# Patient Record
Sex: MALE | Race: BLACK OR AFRICAN AMERICAN | NOT HISPANIC OR LATINO | URBAN - METROPOLITAN AREA
[De-identification: names, ages, dates, MRNs, and addresses within clinical notes are randomized per-mention and may not be internally consistent; named-entity substitution may affect disease eponyms.]

---

## 2021-08-30 ENCOUNTER — OUTPATIENT (OUTPATIENT)
Dept: OUTPATIENT SERVICES | Facility: HOSPITAL | Age: 56
LOS: 1 days | End: 2021-08-30
Payer: MEDICAID

## 2021-08-30 VITALS — WEIGHT: 265 LBS | RESPIRATION RATE: 18 BRPM | TEMPERATURE: 99 F | HEIGHT: 69 IN

## 2021-08-30 DIAGNOSIS — Z29.9 ENCOUNTER FOR PROPHYLACTIC MEASURES, UNSPECIFIED: ICD-10-CM

## 2021-08-30 DIAGNOSIS — E66.01 MORBID (SEVERE) OBESITY DUE TO EXCESS CALORIES: ICD-10-CM

## 2021-08-30 DIAGNOSIS — I10 ESSENTIAL (PRIMARY) HYPERTENSION: ICD-10-CM

## 2021-08-30 DIAGNOSIS — K43.6 OTHER AND UNSPECIFIED VENTRAL HERNIA WITH OBSTRUCTION, WITHOUT GANGRENE: ICD-10-CM

## 2021-08-30 DIAGNOSIS — Z98.890 OTHER SPECIFIED POSTPROCEDURAL STATES: Chronic | ICD-10-CM

## 2021-08-30 DIAGNOSIS — Z01.818 ENCOUNTER FOR OTHER PREPROCEDURAL EXAMINATION: ICD-10-CM

## 2021-08-30 PROCEDURE — G0463: CPT

## 2021-08-30 RX ORDER — LISINOPRIL 2.5 MG/1
1 TABLET ORAL
Qty: 0 | Refills: 0 | DISCHARGE

## 2021-08-30 RX ORDER — ATORVASTATIN CALCIUM 80 MG/1
1 TABLET, FILM COATED ORAL
Qty: 0 | Refills: 0 | DISCHARGE

## 2021-08-30 NOTE — H&P PST ADULT - NSICDXFAMILYHX_GEN_ALL_CORE_FT
FAMILY HISTORY:  Father  Still living? Yes, Estimated age: 81  Family history of diabetes mellitus (DM), Age at diagnosis: Age Unknown    Mother  Still living? Yes, Estimated age: 80  Family history of diabetes mellitus (DM), Age at diagnosis: Age Unknown  FH: CAD (coronary artery disease), Age at diagnosis: Age Unknown

## 2021-08-30 NOTE — H&P PST ADULT - NSANTHOSAYNRD_GEN_A_CORE
No. SILVANO screening performed.  STOP BANG Legend: 0-2 = LOW Risk; 3-4 = INTERMEDIATE Risk; 5-8 = HIGH Risk

## 2021-08-30 NOTE — H&P PST ADULT - NSICDXPASTMEDICALHX_GEN_ALL_CORE_FT
PAST MEDICAL HISTORY:  DM (diabetes mellitus)     Morbidly obese     Morbidly obese     Other and unspecified ventral hernia with obstruction, without gangrene

## 2021-08-30 NOTE — H&P PST ADULT - HISTORY OF PRESENT ILLNESS
55 yr old male morbid obese (BMI 39) diabetes, HTN, patient had PVC's on his EKG done by PMD and was referred to cardiologist for workup. Pt stated last 6 months he experienced some abdominal discomfort especially carrying heavy groceries. Pt seen by his PMD and evaluated his ventral hernia with obstruction without gangrene. Pt is schedule for incarcerated ventral hernia repair on 9/15/2021. Pt also instructed on the use of chlorhexidine wash the day of surgery and verbalized understanding.

## 2021-08-30 NOTE — H&P PST ADULT - ASSESSMENT
55 yr old male with history of diabetes, HTN, had 6 months ago some discomfort in his abdomen and evaluated by his PMD. Pt BMI (39) and was referred to surgeon to evaluated his ventral hernia with obstruction. Pt now schedule for incarcerated ventral hernia repair on 9/15/2021.

## 2021-09-12 ENCOUNTER — APPOINTMENT (OUTPATIENT)
Dept: DISASTER EMERGENCY | Facility: CLINIC | Age: 56
End: 2021-09-12

## 2021-09-12 DIAGNOSIS — Z01.818 ENCOUNTER FOR OTHER PREPROCEDURAL EXAMINATION: ICD-10-CM

## 2021-09-12 PROBLEM — Z00.00 ENCOUNTER FOR PREVENTIVE HEALTH EXAMINATION: Status: ACTIVE | Noted: 2021-09-12

## 2021-09-13 LAB — SARS-COV-2 N GENE NPH QL NAA+PROBE: NOT DETECTED

## 2021-09-14 ENCOUNTER — TRANSCRIPTION ENCOUNTER (OUTPATIENT)
Age: 56
End: 2021-09-14

## 2021-09-15 ENCOUNTER — OUTPATIENT (OUTPATIENT)
Dept: OUTPATIENT SERVICES | Facility: HOSPITAL | Age: 56
LOS: 1 days | End: 2021-09-15
Payer: MEDICAID

## 2021-09-15 VITALS
HEART RATE: 90 BPM | DIASTOLIC BLOOD PRESSURE: 66 MMHG | OXYGEN SATURATION: 97 % | SYSTOLIC BLOOD PRESSURE: 136 MMHG | WEIGHT: 265 LBS | TEMPERATURE: 99 F | HEIGHT: 69 IN | RESPIRATION RATE: 16 BRPM

## 2021-09-15 VITALS
TEMPERATURE: 98 F | RESPIRATION RATE: 17 BRPM | OXYGEN SATURATION: 99 % | DIASTOLIC BLOOD PRESSURE: 78 MMHG | SYSTOLIC BLOOD PRESSURE: 146 MMHG | HEART RATE: 77 BPM

## 2021-09-15 DIAGNOSIS — K43.6 OTHER AND UNSPECIFIED VENTRAL HERNIA WITH OBSTRUCTION, WITHOUT GANGRENE: ICD-10-CM

## 2021-09-15 DIAGNOSIS — Z98.890 OTHER SPECIFIED POSTPROCEDURAL STATES: Chronic | ICD-10-CM

## 2021-09-15 LAB
GLUCOSE BLDC GLUCOMTR-MCNC: 206 MG/DL — HIGH (ref 70–99)
GLUCOSE BLDC GLUCOMTR-MCNC: 207 MG/DL — HIGH (ref 70–99)
GLUCOSE BLDC GLUCOMTR-MCNC: 291 MG/DL — HIGH (ref 70–99)

## 2021-09-15 PROCEDURE — 49561: CPT

## 2021-09-15 PROCEDURE — 82962 GLUCOSE BLOOD TEST: CPT

## 2021-09-15 RX ORDER — OXYCODONE AND ACETAMINOPHEN 5; 325 MG/1; MG/1
1 TABLET ORAL
Qty: 30 | Refills: 0
Start: 2021-09-15 | End: 2021-09-19

## 2021-09-15 RX ORDER — SODIUM CHLORIDE 9 MG/ML
3 INJECTION INTRAMUSCULAR; INTRAVENOUS; SUBCUTANEOUS EVERY 8 HOURS
Refills: 0 | Status: DISCONTINUED | OUTPATIENT
Start: 2021-09-15 | End: 2021-09-15

## 2021-09-15 RX ORDER — FENTANYL CITRATE 50 UG/ML
25 INJECTION INTRAVENOUS
Refills: 0 | Status: DISCONTINUED | OUTPATIENT
Start: 2021-09-15 | End: 2021-09-15

## 2021-09-15 RX ORDER — ONDANSETRON 8 MG/1
4 TABLET, FILM COATED ORAL ONCE
Refills: 0 | Status: DISCONTINUED | OUTPATIENT
Start: 2021-09-15 | End: 2021-09-15

## 2021-09-15 RX ORDER — OXYCODONE HYDROCHLORIDE 5 MG/1
5 TABLET ORAL ONCE
Refills: 0 | Status: DISCONTINUED | OUTPATIENT
Start: 2021-09-15 | End: 2021-09-15

## 2021-09-15 RX ORDER — SODIUM CHLORIDE 9 MG/ML
1000 INJECTION, SOLUTION INTRAVENOUS
Refills: 0 | Status: DISCONTINUED | OUTPATIENT
Start: 2021-09-15 | End: 2021-09-15

## 2021-09-15 NOTE — ASU DISCHARGE PLAN (ADULT/PEDIATRIC) - ASU DC SPECIAL INSTRUCTIONSFT
Pain control:  Motrin 600mg every 6 hours for mild to moderate pain.  Percocet 5/325mg 1-2 tablets every 4-6 hours for moderate to severe pain    Avoid lifting over 15 lbs.    You may remove the clear dressing and gauze on Thursday afternoon, but leave the paper strips in place as they will fall off on their own. You may begin showering at this time. Allow warm soapy water to run over wound, and pat dry. Do not rub wound, and no soaking.    Follow-up in clinic in two weeks.

## 2021-09-15 NOTE — ASU DISCHARGE PLAN (ADULT/PEDIATRIC) - CARE PROVIDER_API CALL
Shiva Bullock)  ColonRectal Surgery; Surgery  [address] [place] [address]  Phone: [phone]  Fax: [phone]  Established Patient  Follow Up Time: 2 weeks

## 2024-01-01 ENCOUNTER — INPATIENT (INPATIENT)
Facility: HOSPITAL | Age: 59
LOS: 2 days | End: 2024-03-15
Attending: EMERGENCY MEDICINE | Admitting: INTERNAL MEDICINE
Payer: MEDICAID

## 2024-01-01 ENCOUNTER — RESULT REVIEW (OUTPATIENT)
Age: 59
End: 2024-01-01

## 2024-01-01 VITALS
SYSTOLIC BLOOD PRESSURE: 153 MMHG | OXYGEN SATURATION: 98 % | HEART RATE: 120 BPM | WEIGHT: 250 LBS | TEMPERATURE: 98 F | RESPIRATION RATE: 24 BRPM | DIASTOLIC BLOOD PRESSURE: 69 MMHG | HEIGHT: 69 IN

## 2024-01-01 DIAGNOSIS — G93.41 METABOLIC ENCEPHALOPATHY: ICD-10-CM

## 2024-01-01 DIAGNOSIS — K59.00 CONSTIPATION, UNSPECIFIED: ICD-10-CM

## 2024-01-01 DIAGNOSIS — I10 ESSENTIAL (PRIMARY) HYPERTENSION: ICD-10-CM
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
## 2024-01-01 DIAGNOSIS — E11.9 TYPE 2 DIABETES MELLITUS WITHOUT COMPLICATIONS: ICD-10-CM

## 2024-01-01 DIAGNOSIS — A41.9 SEPSIS, UNSPECIFIED ORGANISM: ICD-10-CM

## 2024-01-01 DIAGNOSIS — Z98.890 OTHER SPECIFIED POSTPROCEDURAL STATES: Chronic | ICD-10-CM

## 2024-01-01 DIAGNOSIS — I48.91 UNSPECIFIED ATRIAL FIBRILLATION: ICD-10-CM

## 2024-01-01 DIAGNOSIS — M54.9 DORSALGIA, UNSPECIFIED: ICD-10-CM

## 2024-01-01 LAB
-  AMPICILLIN/SULBACTAM: SIGNIFICANT CHANGE UP
-  CEFAZOLIN: SIGNIFICANT CHANGE UP
-  CLINDAMYCIN: SIGNIFICANT CHANGE UP
-  ERYTHROMYCIN: SIGNIFICANT CHANGE UP
-  GENTAMICIN: SIGNIFICANT CHANGE UP
-  OXACILLIN: SIGNIFICANT CHANGE UP
-  PENICILLIN: SIGNIFICANT CHANGE UP
-  RIFAMPIN: SIGNIFICANT CHANGE UP
-  TETRACYCLINE: SIGNIFICANT CHANGE UP
-  TRIMETHOPRIM/SULFAMETHOXAZOLE: SIGNIFICANT CHANGE UP
-  VANCOMYCIN: SIGNIFICANT CHANGE UP
A1C WITH ESTIMATED AVERAGE GLUCOSE RESULT: 9.3 % — HIGH (ref 4–5.6)
ALBUMIN SERPL ELPH-MCNC: 1.4 G/DL — LOW (ref 3.3–5)
ALBUMIN SERPL ELPH-MCNC: 1.8 G/DL — LOW (ref 3.3–5)
ALBUMIN SERPL ELPH-MCNC: 1.9 G/DL — LOW (ref 3.3–5)
ALBUMIN SERPL ELPH-MCNC: 2.4 G/DL — LOW (ref 3.3–5)
ALBUMIN SERPL ELPH-MCNC: 3 G/DL — LOW (ref 3.3–5)
ALP SERPL-CCNC: 128 U/L — HIGH (ref 40–120)
ALP SERPL-CCNC: 136 U/L — HIGH (ref 40–120)
ALP SERPL-CCNC: 139 U/L — HIGH (ref 40–120)
ALP SERPL-CCNC: 144 U/L — HIGH (ref 40–120)
ALP SERPL-CCNC: 372 U/L — HIGH (ref 40–120)
ALT FLD-CCNC: 41 U/L — SIGNIFICANT CHANGE UP (ref 12–78)
ALT FLD-CCNC: 46 U/L — SIGNIFICANT CHANGE UP (ref 12–78)
ALT FLD-CCNC: 55 U/L — SIGNIFICANT CHANGE UP (ref 12–78)
ALT FLD-CCNC: 5506 U/L — HIGH (ref 12–78)
ALT FLD-CCNC: 60 U/L — SIGNIFICANT CHANGE UP (ref 12–78)
AMMONIA BLD-MCNC: 179 UMOL/L — HIGH (ref 11–32)
AMMONIA BLD-MCNC: 188 UMOL/L — HIGH (ref 11–32)
AMMONIA BLD-MCNC: 73 UMOL/L — HIGH (ref 11–32)
AMMONIA BLD-MCNC: 82 UMOL/L — HIGH (ref 11–32)
ANION GAP SERPL CALC-SCNC: 11 MMOL/L — SIGNIFICANT CHANGE UP (ref 5–17)
ANION GAP SERPL CALC-SCNC: 11 MMOL/L — SIGNIFICANT CHANGE UP (ref 5–17)
ANION GAP SERPL CALC-SCNC: 14 MMOL/L — SIGNIFICANT CHANGE UP (ref 5–17)
ANION GAP SERPL CALC-SCNC: 17 MMOL/L — SIGNIFICANT CHANGE UP (ref 5–17)
ANION GAP SERPL CALC-SCNC: 17 MMOL/L — SIGNIFICANT CHANGE UP (ref 5–17)
ANION GAP SERPL CALC-SCNC: 19 MMOL/L — HIGH (ref 5–17)
ANION GAP SERPL CALC-SCNC: 22 MMOL/L — HIGH (ref 5–17)
APPEARANCE UR: CLEAR — SIGNIFICANT CHANGE UP
APTT BLD: 27.3 SEC — SIGNIFICANT CHANGE UP (ref 24.5–35.6)
AST SERPL-CCNC: 21 U/L — SIGNIFICANT CHANGE UP (ref 15–37)
AST SERPL-CCNC: 23 U/L — SIGNIFICANT CHANGE UP (ref 15–37)
AST SERPL-CCNC: 35 U/L — SIGNIFICANT CHANGE UP (ref 15–37)
AST SERPL-CCNC: 46 U/L — HIGH (ref 15–37)
AST SERPL-CCNC: HIGH U/L (ref 15–37)
B-OH-BUTYR SERPL-SCNC: 3.4 MMOL/L — HIGH
BASE EXCESS BLDA CALC-SCNC: -0.9 MMOL/L — SIGNIFICANT CHANGE UP (ref -2–3)
BASE EXCESS BLDA CALC-SCNC: -11.7 MMOL/L — LOW (ref -2–3)
BASE EXCESS BLDA CALC-SCNC: -15.8 MMOL/L — LOW (ref -2–3)
BASE EXCESS BLDA CALC-SCNC: -5.6 MMOL/L — LOW (ref -2–3)
BASE EXCESS BLDV CALC-SCNC: -7.5 MMOL/L — LOW (ref -2–3)
BASOPHILS # BLD AUTO: 0 K/UL — SIGNIFICANT CHANGE UP (ref 0–0.2)
BASOPHILS # BLD AUTO: 0 K/UL — SIGNIFICANT CHANGE UP (ref 0–0.2)
BASOPHILS # BLD AUTO: 0.02 K/UL — SIGNIFICANT CHANGE UP (ref 0–0.2)
BASOPHILS # BLD AUTO: 0.13 K/UL — SIGNIFICANT CHANGE UP (ref 0–0.2)
BASOPHILS NFR BLD AUTO: 0 % — SIGNIFICANT CHANGE UP (ref 0–2)
BASOPHILS NFR BLD AUTO: 0 % — SIGNIFICANT CHANGE UP (ref 0–2)
BASOPHILS NFR BLD AUTO: 0.1 % — SIGNIFICANT CHANGE UP (ref 0–2)
BASOPHILS NFR BLD AUTO: 0.7 % — SIGNIFICANT CHANGE UP (ref 0–2)
BILIRUB SERPL-MCNC: 0.4 MG/DL — SIGNIFICANT CHANGE UP (ref 0.2–1.2)
BILIRUB SERPL-MCNC: 0.5 MG/DL — SIGNIFICANT CHANGE UP (ref 0.2–1.2)
BILIRUB SERPL-MCNC: 0.5 MG/DL — SIGNIFICANT CHANGE UP (ref 0.2–1.2)
BILIRUB SERPL-MCNC: 0.6 MG/DL — SIGNIFICANT CHANGE UP (ref 0.2–1.2)
BILIRUB SERPL-MCNC: 1 MG/DL — SIGNIFICANT CHANGE UP (ref 0.2–1.2)
BILIRUB UR-MCNC: NEGATIVE — SIGNIFICANT CHANGE UP
BLOOD GAS COMMENTS ARTERIAL: SIGNIFICANT CHANGE UP
BLOOD GAS COMMENTS, VENOUS: SIGNIFICANT CHANGE UP
BUN SERPL-MCNC: 37 MG/DL — HIGH (ref 7–23)
BUN SERPL-MCNC: 40 MG/DL — HIGH (ref 7–23)
BUN SERPL-MCNC: 45 MG/DL — HIGH (ref 7–23)
BUN SERPL-MCNC: 60 MG/DL — HIGH (ref 7–23)
BUN SERPL-MCNC: 73 MG/DL — HIGH (ref 7–23)
BUN SERPL-MCNC: 91 MG/DL — HIGH (ref 7–23)
BUN SERPL-MCNC: 92 MG/DL — HIGH (ref 7–23)
CALCIUM SERPL-MCNC: 10.3 MG/DL — HIGH (ref 8.5–10.1)
CALCIUM SERPL-MCNC: 11.7 MG/DL — HIGH (ref 8.5–10.1)
CALCIUM SERPL-MCNC: 8.5 MG/DL — SIGNIFICANT CHANGE UP (ref 8.5–10.1)
CALCIUM SERPL-MCNC: 9 MG/DL — SIGNIFICANT CHANGE UP (ref 8.5–10.1)
CALCIUM SERPL-MCNC: 9.1 MG/DL — SIGNIFICANT CHANGE UP (ref 8.5–10.1)
CALCIUM SERPL-MCNC: 9.4 MG/DL — SIGNIFICANT CHANGE UP (ref 8.5–10.1)
CALCIUM SERPL-MCNC: 9.4 MG/DL — SIGNIFICANT CHANGE UP (ref 8.5–10.1)
CHLORIDE BLDV-SCNC: 94 MMOL/L — LOW (ref 98–107)
CHLORIDE SERPL-SCNC: 101 MMOL/L — SIGNIFICANT CHANGE UP (ref 96–108)
CHLORIDE SERPL-SCNC: 103 MMOL/L — SIGNIFICANT CHANGE UP (ref 96–108)
CHLORIDE SERPL-SCNC: 107 MMOL/L — SIGNIFICANT CHANGE UP (ref 96–108)
CHLORIDE SERPL-SCNC: 96 MMOL/L — SIGNIFICANT CHANGE UP (ref 96–108)
CHLORIDE SERPL-SCNC: 99 MMOL/L — SIGNIFICANT CHANGE UP (ref 96–108)
CHOLEST SERPL-MCNC: 129 MG/DL — SIGNIFICANT CHANGE UP
CO2 BLDA-SCNC: 19 MMOL/L — SIGNIFICANT CHANGE UP (ref 19–24)
CO2 BLDA-SCNC: 22 MMOL/L — SIGNIFICANT CHANGE UP (ref 19–24)
CO2 BLDA-SCNC: 22 MMOL/L — SIGNIFICANT CHANGE UP (ref 19–24)
CO2 BLDA-SCNC: 23 MMOL/L — SIGNIFICANT CHANGE UP (ref 19–24)
CO2 BLDV-SCNC: 18 MMOL/L — LOW (ref 22–26)
CO2 SERPL-SCNC: 14 MMOL/L — LOW (ref 22–31)
CO2 SERPL-SCNC: 16 MMOL/L — LOW (ref 22–31)
CO2 SERPL-SCNC: 18 MMOL/L — LOW (ref 22–31)
CO2 SERPL-SCNC: 18 MMOL/L — LOW (ref 22–31)
CO2 SERPL-SCNC: 19 MMOL/L — LOW (ref 22–31)
CO2 SERPL-SCNC: 20 MMOL/L — LOW (ref 22–31)
CO2 SERPL-SCNC: 22 MMOL/L — SIGNIFICANT CHANGE UP (ref 22–31)
COLOR SPEC: YELLOW — SIGNIFICANT CHANGE UP
CREAT SERPL-MCNC: 0.92 MG/DL — SIGNIFICANT CHANGE UP (ref 0.5–1.3)
CREAT SERPL-MCNC: 1.11 MG/DL — SIGNIFICANT CHANGE UP (ref 0.5–1.3)
CREAT SERPL-MCNC: 1.17 MG/DL — SIGNIFICANT CHANGE UP (ref 0.5–1.3)
CREAT SERPL-MCNC: 1.46 MG/DL — HIGH (ref 0.5–1.3)
CREAT SERPL-MCNC: 1.5 MG/DL — HIGH (ref 0.5–1.3)
CREAT SERPL-MCNC: 2.12 MG/DL — HIGH (ref 0.5–1.3)
CREAT SERPL-MCNC: 2.89 MG/DL — HIGH (ref 0.5–1.3)
CULTURE RESULTS: ABNORMAL
CULTURE RESULTS: SIGNIFICANT CHANGE UP
DIFF PNL FLD: ABNORMAL
EGFR: 24 ML/MIN/1.73M2 — LOW
EGFR: 35 ML/MIN/1.73M2 — LOW
EGFR: 53 ML/MIN/1.73M2 — LOW
EGFR: 55 ML/MIN/1.73M2 — LOW
EGFR: 72 ML/MIN/1.73M2 — SIGNIFICANT CHANGE UP
EGFR: 76 ML/MIN/1.73M2 — SIGNIFICANT CHANGE UP
EGFR: 96 ML/MIN/1.73M2 — SIGNIFICANT CHANGE UP
EOSINOPHIL # BLD AUTO: 0 K/UL — SIGNIFICANT CHANGE UP (ref 0–0.5)
EOSINOPHIL NFR BLD AUTO: 0 % — SIGNIFICANT CHANGE UP (ref 0–6)
ESTIMATED AVERAGE GLUCOSE: 220 MG/DL — HIGH (ref 68–114)
GAS PNL BLDA: SIGNIFICANT CHANGE UP
GAS PNL BLDV: 128 MMOL/L — LOW (ref 136–145)
GAS PNL BLDV: SIGNIFICANT CHANGE UP
GLUCOSE BLDC GLUCOMTR-MCNC: 117 MG/DL — HIGH (ref 70–99)
GLUCOSE BLDC GLUCOMTR-MCNC: 152 MG/DL — HIGH (ref 70–99)
GLUCOSE BLDC GLUCOMTR-MCNC: 252 MG/DL — HIGH (ref 70–99)
GLUCOSE BLDC GLUCOMTR-MCNC: 252 MG/DL — HIGH (ref 70–99)
GLUCOSE BLDC GLUCOMTR-MCNC: 274 MG/DL — HIGH (ref 70–99)
GLUCOSE BLDC GLUCOMTR-MCNC: 333 MG/DL — HIGH (ref 70–99)
GLUCOSE BLDC GLUCOMTR-MCNC: 337 MG/DL — HIGH (ref 70–99)
GLUCOSE BLDC GLUCOMTR-MCNC: 345 MG/DL — HIGH (ref 70–99)
GLUCOSE BLDC GLUCOMTR-MCNC: 358 MG/DL — HIGH (ref 70–99)
GLUCOSE BLDC GLUCOMTR-MCNC: 361 MG/DL — HIGH (ref 70–99)
GLUCOSE BLDC GLUCOMTR-MCNC: 393 MG/DL — HIGH (ref 70–99)
GLUCOSE BLDC GLUCOMTR-MCNC: 394 MG/DL — HIGH (ref 70–99)
GLUCOSE BLDC GLUCOMTR-MCNC: 398 MG/DL — HIGH (ref 70–99)
GLUCOSE BLDC GLUCOMTR-MCNC: 407 MG/DL — HIGH (ref 70–99)
GLUCOSE BLDC GLUCOMTR-MCNC: 412 MG/DL — HIGH (ref 70–99)
GLUCOSE BLDC GLUCOMTR-MCNC: 512 MG/DL — CRITICAL HIGH (ref 70–99)
GLUCOSE BLDV-MCNC: 476 MG/DL — CRITICAL HIGH (ref 65–95)
GLUCOSE SERPL-MCNC: 148 MG/DL — HIGH (ref 70–99)
GLUCOSE SERPL-MCNC: 276 MG/DL — HIGH (ref 70–99)
GLUCOSE SERPL-MCNC: 381 MG/DL — HIGH (ref 70–99)
GLUCOSE SERPL-MCNC: 430 MG/DL — HIGH (ref 70–99)
GLUCOSE SERPL-MCNC: 438 MG/DL — HIGH (ref 70–99)
GLUCOSE SERPL-MCNC: 485 MG/DL — CRITICAL HIGH (ref 70–99)
GLUCOSE SERPL-MCNC: 494 MG/DL — CRITICAL HIGH (ref 70–99)
GLUCOSE UR QL: >=1000 MG/DL
GRAM STN FLD: ABNORMAL
HAV IGM SER-ACNC: SIGNIFICANT CHANGE UP
HBV CORE IGM SER-ACNC: SIGNIFICANT CHANGE UP
HBV SURFACE AG SER-ACNC: SIGNIFICANT CHANGE UP
HCO3 BLDA-SCNC: 18 MMOL/L — LOW (ref 21–28)
HCO3 BLDA-SCNC: 19 MMOL/L — LOW (ref 21–28)
HCO3 BLDA-SCNC: 20 MMOL/L — LOW (ref 21–28)
HCO3 BLDA-SCNC: 22 MMOL/L — SIGNIFICANT CHANGE UP (ref 21–28)
HCO3 BLDV-SCNC: 17 MMOL/L — LOW (ref 22–28)
HCOV PNL SPEC NAA+PROBE: DETECTED
HCT VFR BLD CALC: 41 % — SIGNIFICANT CHANGE UP (ref 39–50)
HCT VFR BLD CALC: 43.2 % — SIGNIFICANT CHANGE UP (ref 39–50)
HCT VFR BLD CALC: 43.6 % — SIGNIFICANT CHANGE UP (ref 39–50)
HCT VFR BLD CALC: 44.3 % — SIGNIFICANT CHANGE UP (ref 39–50)
HCT VFR BLD CALC: 45.4 % — SIGNIFICANT CHANGE UP (ref 39–50)
HCT VFR BLD CALC: 46.4 % — SIGNIFICANT CHANGE UP (ref 39–50)
HCT VFR BLDA CALC: 49 % — HIGH (ref 37–47)
HCV AB S/CO SERPL IA: 0.71 S/CO — SIGNIFICANT CHANGE UP (ref 0–0.99)
HCV AB S/CO SERPL IA: 0.81 S/CO — SIGNIFICANT CHANGE UP (ref 0–0.99)
HCV AB SERPL-IMP: ABNORMAL
HCV AB SERPL-IMP: SIGNIFICANT CHANGE UP
HDLC SERPL-MCNC: 14 MG/DL — LOW
HGB BLD CALC-MCNC: 16.2 G/DL — SIGNIFICANT CHANGE UP (ref 12.6–17.4)
HGB BLD-MCNC: 14.1 G/DL — SIGNIFICANT CHANGE UP (ref 13–17)
HGB BLD-MCNC: 14.4 G/DL — SIGNIFICANT CHANGE UP (ref 13–17)
HGB BLD-MCNC: 14.9 G/DL — SIGNIFICANT CHANGE UP (ref 13–17)
HGB BLD-MCNC: 15.4 G/DL — SIGNIFICANT CHANGE UP (ref 13–17)
HOROWITZ INDEX BLDA+IHG-RTO: 100 — SIGNIFICANT CHANGE UP
HOROWITZ INDEX BLDA+IHG-RTO: 100 — SIGNIFICANT CHANGE UP
HOROWITZ INDEX BLDA+IHG-RTO: 29 — SIGNIFICANT CHANGE UP
HOROWITZ INDEX BLDA+IHG-RTO: 36 — SIGNIFICANT CHANGE UP
HOROWITZ INDEX BLDV+IHG-RTO: 21 — SIGNIFICANT CHANGE UP
IMM GRANULOCYTES NFR BLD AUTO: 1.7 % — HIGH (ref 0–0.9)
IMM GRANULOCYTES NFR BLD AUTO: 2.6 % — HIGH (ref 0–0.9)
INR BLD: 1.16 RATIO — SIGNIFICANT CHANGE UP (ref 0.85–1.18)
KETONES UR-MCNC: 40 MG/DL
LACTATE BLDV-MCNC: 2.8 MMOL/L — HIGH (ref 0.56–1.39)
LACTATE SERPL-SCNC: 1.9 MMOL/L — SIGNIFICANT CHANGE UP (ref 0.7–2)
LACTATE SERPL-SCNC: 1.9 MMOL/L — SIGNIFICANT CHANGE UP (ref 0.7–2)
LACTATE SERPL-SCNC: 14.5 MMOL/L — CRITICAL HIGH (ref 0.7–2)
LACTATE SERPL-SCNC: 2.1 MMOL/L — HIGH (ref 0.7–2)
LACTATE SERPL-SCNC: 3.6 MMOL/L — HIGH (ref 0.7–2)
LEUKOCYTE ESTERASE UR-ACNC: NEGATIVE — SIGNIFICANT CHANGE UP
LG PLATELETS BLD QL AUTO: SLIGHT — SIGNIFICANT CHANGE UP
LIPID PNL WITH DIRECT LDL SERPL: 86 MG/DL — SIGNIFICANT CHANGE UP
LYMPHOCYTES # BLD AUTO: 0.64 K/UL — LOW (ref 1–3.3)
LYMPHOCYTES # BLD AUTO: 1.08 K/UL — SIGNIFICANT CHANGE UP (ref 1–3.3)
LYMPHOCYTES # BLD AUTO: 10.8 % — LOW (ref 13–44)
LYMPHOCYTES # BLD AUTO: 2.38 K/UL — SIGNIFICANT CHANGE UP (ref 1–3.3)
LYMPHOCYTES # BLD AUTO: 3 % — LOW (ref 13–44)
LYMPHOCYTES # BLD AUTO: 31 % — SIGNIFICANT CHANGE UP (ref 13–44)
LYMPHOCYTES # BLD AUTO: 5.75 K/UL — HIGH (ref 1–3.3)
LYMPHOCYTES # BLD AUTO: 6 % — LOW (ref 13–44)
MAGNESIUM SERPL-MCNC: 2.8 MG/DL — HIGH (ref 1.6–2.6)
MAGNESIUM SERPL-MCNC: 3 MG/DL — HIGH (ref 1.6–2.6)
MAGNESIUM SERPL-MCNC: 3.4 MG/DL — HIGH (ref 1.6–2.6)
MAGNESIUM SERPL-MCNC: 4.3 MG/DL — HIGH (ref 1.6–2.6)
MANUAL SMEAR VERIFICATION: SIGNIFICANT CHANGE UP
MCHC RBC-ENTMCNC: 28.6 PG — SIGNIFICANT CHANGE UP (ref 27–34)
MCHC RBC-ENTMCNC: 29 PG — SIGNIFICANT CHANGE UP (ref 27–34)
MCHC RBC-ENTMCNC: 29 PG — SIGNIFICANT CHANGE UP (ref 27–34)
MCHC RBC-ENTMCNC: 29.1 PG — SIGNIFICANT CHANGE UP (ref 27–34)
MCHC RBC-ENTMCNC: 29.6 PG — SIGNIFICANT CHANGE UP (ref 27–34)
MCHC RBC-ENTMCNC: 29.6 PG — SIGNIFICANT CHANGE UP (ref 27–34)
MCHC RBC-ENTMCNC: 31.8 G/DL — LOW (ref 32–36)
MCHC RBC-ENTMCNC: 33.2 G/DL — SIGNIFICANT CHANGE UP (ref 32–36)
MCHC RBC-ENTMCNC: 33.9 G/DL — SIGNIFICANT CHANGE UP (ref 32–36)
MCHC RBC-ENTMCNC: 34.2 G/DL — SIGNIFICANT CHANGE UP (ref 32–36)
MCHC RBC-ENTMCNC: 35.1 G/DL — SIGNIFICANT CHANGE UP (ref 32–36)
MCHC RBC-ENTMCNC: 35.6 G/DL — SIGNIFICANT CHANGE UP (ref 32–36)
MCV RBC AUTO: 82.5 FL — SIGNIFICANT CHANGE UP (ref 80–100)
MCV RBC AUTO: 82.9 FL — SIGNIFICANT CHANGE UP (ref 80–100)
MCV RBC AUTO: 85 FL — SIGNIFICANT CHANGE UP (ref 80–100)
MCV RBC AUTO: 85.7 FL — SIGNIFICANT CHANGE UP (ref 80–100)
MCV RBC AUTO: 86.2 FL — SIGNIFICANT CHANGE UP (ref 80–100)
MCV RBC AUTO: 93.1 FL — SIGNIFICANT CHANGE UP (ref 80–100)
METHOD TYPE: SIGNIFICANT CHANGE UP
METHOD TYPE: SIGNIFICANT CHANGE UP
MONOCYTES # BLD AUTO: 1.27 K/UL — HIGH (ref 0–0.9)
MONOCYTES # BLD AUTO: 1.45 K/UL — HIGH (ref 0–0.9)
MONOCYTES # BLD AUTO: 1.54 K/UL — HIGH (ref 0–0.9)
MONOCYTES # BLD AUTO: 2.41 K/UL — HIGH (ref 0–0.9)
MONOCYTES NFR BLD AUTO: 13 % — SIGNIFICANT CHANGE UP (ref 2–14)
MONOCYTES NFR BLD AUTO: 6 % — SIGNIFICANT CHANGE UP (ref 2–14)
MONOCYTES NFR BLD AUTO: 6.6 % — SIGNIFICANT CHANGE UP (ref 2–14)
MONOCYTES NFR BLD AUTO: 8.5 % — SIGNIFICANT CHANGE UP (ref 2–14)
MSSA DNA SPEC QL NAA+PROBE: SIGNIFICANT CHANGE UP
NEUTROPHILS # BLD AUTO: 14.9 K/UL — HIGH (ref 1.8–7.4)
NEUTROPHILS # BLD AUTO: 17.77 K/UL — HIGH (ref 1.8–7.4)
NEUTROPHILS # BLD AUTO: 19.29 K/UL — HIGH (ref 1.8–7.4)
NEUTROPHILS # BLD AUTO: 9.08 K/UL — HIGH (ref 1.8–7.4)
NEUTROPHILS NFR BLD AUTO: 46 % — SIGNIFICANT CHANGE UP (ref 43–77)
NEUTROPHILS NFR BLD AUTO: 80.8 % — HIGH (ref 43–77)
NEUTROPHILS NFR BLD AUTO: 82.2 % — HIGH (ref 43–77)
NEUTROPHILS NFR BLD AUTO: 91 % — HIGH (ref 43–77)
NEUTS BAND # BLD: 3 % — SIGNIFICANT CHANGE UP (ref 0–8)
NITRITE UR-MCNC: NEGATIVE — SIGNIFICANT CHANGE UP
NON HDL CHOLESTEROL: 115 MG/DL — SIGNIFICANT CHANGE UP
NRBC # BLD: 0 /100 WBCS — SIGNIFICANT CHANGE UP (ref 0–0)
NRBC # BLD: SIGNIFICANT CHANGE UP /100 WBCS (ref 0–0)
NRBC # BLD: SIGNIFICANT CHANGE UP /100 WBCS (ref 0–0)
ORGANISM # SPEC MICROSCOPIC CNT: ABNORMAL
ORGANISM # SPEC MICROSCOPIC CNT: ABNORMAL
ORGANISM # SPEC MICROSCOPIC CNT: SIGNIFICANT CHANGE UP
OSMOLALITY SERPL: 311 MOSMOL/KG — HIGH (ref 275–300)
PCO2 BLDA: 30 MMHG — LOW (ref 32–46)
PCO2 BLDA: 30 MMHG — LOW (ref 32–46)
PCO2 BLDA: 72 MMHG — CRITICAL HIGH (ref 32–46)
PCO2 BLDA: 93 MMHG — CRITICAL HIGH (ref 32–46)
PCO2 BLDV: 30 MMHG — LOW (ref 42–55)
PH BLDA: 6.9 — CRITICAL LOW (ref 7.35–7.45)
PH BLDA: 7.05 — CRITICAL LOW (ref 7.35–7.45)
PH BLDA: 7.39 — SIGNIFICANT CHANGE UP (ref 7.35–7.45)
PH BLDA: 7.47 — HIGH (ref 7.35–7.45)
PH BLDV: 7.35 — SIGNIFICANT CHANGE UP (ref 7.32–7.43)
PH UR: 5.5 — SIGNIFICANT CHANGE UP (ref 5–8)
PHOSPHATE SERPL-MCNC: 3.2 MG/DL — SIGNIFICANT CHANGE UP (ref 2.5–4.5)
PHOSPHATE SERPL-MCNC: 4.5 MG/DL — SIGNIFICANT CHANGE UP (ref 2.5–4.5)
PHOSPHATE SERPL-MCNC: 6.2 MG/DL — HIGH (ref 2.5–4.5)
PHOSPHATE SERPL-MCNC: >18 MG/DL — HIGH (ref 2.5–4.5)
PLAT MORPH BLD: NORMAL — SIGNIFICANT CHANGE UP
PLATELET # BLD AUTO: 101 K/UL — LOW (ref 150–400)
PLATELET # BLD AUTO: 204 K/UL — SIGNIFICANT CHANGE UP (ref 150–400)
PLATELET # BLD AUTO: 234 K/UL — SIGNIFICANT CHANGE UP (ref 150–400)
PLATELET # BLD AUTO: 240 K/UL — SIGNIFICANT CHANGE UP (ref 150–400)
PLATELET # BLD AUTO: 265 K/UL — SIGNIFICANT CHANGE UP (ref 150–400)
PLATELET # BLD AUTO: 308 K/UL — SIGNIFICANT CHANGE UP (ref 150–400)
PLATELET CLUMP BLD QL SMEAR: SLIGHT
PLATELET COUNT - ESTIMATE: ABNORMAL
PO2 BLDA: 60 MMHG — LOW (ref 83–108)
PO2 BLDA: 64 MMHG — LOW (ref 83–108)
PO2 BLDA: 77 MMHG — LOW (ref 83–108)
PO2 BLDA: 80 MMHG — LOW (ref 83–108)
PO2 BLDV: 47 MMHG — HIGH (ref 25–45)
POTASSIUM BLDV-SCNC: 4.1 MMOL/L — SIGNIFICANT CHANGE UP (ref 3.5–5.1)
POTASSIUM SERPL-MCNC: 3.8 MMOL/L — SIGNIFICANT CHANGE UP (ref 3.5–5.3)
POTASSIUM SERPL-MCNC: 3.9 MMOL/L — SIGNIFICANT CHANGE UP (ref 3.5–5.3)
POTASSIUM SERPL-MCNC: 4.1 MMOL/L — SIGNIFICANT CHANGE UP (ref 3.5–5.3)
POTASSIUM SERPL-MCNC: 4.2 MMOL/L — SIGNIFICANT CHANGE UP (ref 3.5–5.3)
POTASSIUM SERPL-MCNC: 4.2 MMOL/L — SIGNIFICANT CHANGE UP (ref 3.5–5.3)
POTASSIUM SERPL-MCNC: 4.8 MMOL/L — SIGNIFICANT CHANGE UP (ref 3.5–5.3)
POTASSIUM SERPL-MCNC: 7.4 MMOL/L — CRITICAL HIGH (ref 3.5–5.3)
POTASSIUM SERPL-SCNC: 3.8 MMOL/L — SIGNIFICANT CHANGE UP (ref 3.5–5.3)
POTASSIUM SERPL-SCNC: 3.9 MMOL/L — SIGNIFICANT CHANGE UP (ref 3.5–5.3)
POTASSIUM SERPL-SCNC: 4.1 MMOL/L — SIGNIFICANT CHANGE UP (ref 3.5–5.3)
POTASSIUM SERPL-SCNC: 4.2 MMOL/L — SIGNIFICANT CHANGE UP (ref 3.5–5.3)
POTASSIUM SERPL-SCNC: 4.2 MMOL/L — SIGNIFICANT CHANGE UP (ref 3.5–5.3)
POTASSIUM SERPL-SCNC: 4.8 MMOL/L — SIGNIFICANT CHANGE UP (ref 3.5–5.3)
POTASSIUM SERPL-SCNC: 7.4 MMOL/L — CRITICAL HIGH (ref 3.5–5.3)
PROCALCITONIN SERPL-MCNC: 2.74 NG/ML — HIGH (ref 0.02–0.1)
PROT SERPL-MCNC: 5.8 GM/DL — LOW (ref 6–8.3)
PROT SERPL-MCNC: 6.9 GM/DL — SIGNIFICANT CHANGE UP (ref 6–8.3)
PROT SERPL-MCNC: 7.4 GM/DL — SIGNIFICANT CHANGE UP (ref 6–8.3)
PROT SERPL-MCNC: 7.5 GM/DL — SIGNIFICANT CHANGE UP (ref 6–8.3)
PROT SERPL-MCNC: 8.7 GM/DL — HIGH (ref 6–8.3)
PROT UR-MCNC: 30 MG/DL
PROTHROM AB SERPL-ACNC: 13.7 SEC — HIGH (ref 9.5–13)
RAPID RVP RESULT: DETECTED
RBC # BLD: 4.76 M/UL — SIGNIFICANT CHANGE UP (ref 4.2–5.8)
RBC # BLD: 4.97 M/UL — SIGNIFICANT CHANGE UP (ref 4.2–5.8)
RBC # BLD: 5.13 M/UL — SIGNIFICANT CHANGE UP (ref 4.2–5.8)
RBC # BLD: 5.21 M/UL — SIGNIFICANT CHANGE UP (ref 4.2–5.8)
RBC # BLD: 5.3 M/UL — SIGNIFICANT CHANGE UP (ref 4.2–5.8)
RBC # BLD: 5.38 M/UL — SIGNIFICANT CHANGE UP (ref 4.2–5.8)
RBC # FLD: 13.1 % — SIGNIFICANT CHANGE UP (ref 10.3–14.5)
RBC # FLD: 13.2 % — SIGNIFICANT CHANGE UP (ref 10.3–14.5)
RBC # FLD: 13.4 % — SIGNIFICANT CHANGE UP (ref 10.3–14.5)
RBC # FLD: 13.7 % — SIGNIFICANT CHANGE UP (ref 10.3–14.5)
RBC # FLD: 14 % — SIGNIFICANT CHANGE UP (ref 10.3–14.5)
RBC # FLD: 14.4 % — SIGNIFICANT CHANGE UP (ref 10.3–14.5)
RBC BLD AUTO: NORMAL — SIGNIFICANT CHANGE UP
SAO2 % BLDA: 80.1 % — LOW (ref 94–98)
SAO2 % BLDA: 81 % — LOW (ref 94–98)
SAO2 % BLDA: 95.9 % — SIGNIFICANT CHANGE UP (ref 94–98)
SAO2 % BLDA: 98 % — SIGNIFICANT CHANGE UP (ref 94–98)
SAO2 % BLDV: 75.7 % — LOW (ref 94–98)
SARS-COV-2 RNA SPEC QL NAA+PROBE: SIGNIFICANT CHANGE UP
SMUDGE CELLS # BLD: PRESENT — SIGNIFICANT CHANGE UP
SODIUM SERPL-SCNC: 129 MMOL/L — LOW (ref 135–145)
SODIUM SERPL-SCNC: 132 MMOL/L — LOW (ref 135–145)
SODIUM SERPL-SCNC: 132 MMOL/L — LOW (ref 135–145)
SODIUM SERPL-SCNC: 133 MMOL/L — LOW (ref 135–145)
SODIUM SERPL-SCNC: 136 MMOL/L — SIGNIFICANT CHANGE UP (ref 135–145)
SODIUM SERPL-SCNC: 137 MMOL/L — SIGNIFICANT CHANGE UP (ref 135–145)
SODIUM SERPL-SCNC: 147 MMOL/L — HIGH (ref 135–145)
SP GR SPEC: >1.03 — HIGH (ref 1–1.03)
SPECIMEN SOURCE: SIGNIFICANT CHANGE UP
T4 FREE SERPL-MCNC: 1.3 NG/DL — SIGNIFICANT CHANGE UP (ref 0.9–1.8)
TRIGL SERPL-MCNC: 160 MG/DL — HIGH
TSH SERPL-MCNC: 0.1 UU/ML — LOW (ref 0.36–3.74)
UROBILINOGEN FLD QL: 0.2 MG/DL — SIGNIFICANT CHANGE UP (ref 0.2–1)
VANCOMYCIN TROUGH SERPL-MCNC: 5.6 UG/ML — LOW (ref 10–20)
VARIANT LYMPHS # BLD: 7 % — HIGH (ref 0–6)
WBC # BLD: 17.05 K/UL — HIGH (ref 3.8–10.5)
WBC # BLD: 17.88 K/UL — HIGH (ref 3.8–10.5)
WBC # BLD: 18.13 K/UL — HIGH (ref 3.8–10.5)
WBC # BLD: 18.54 K/UL — HIGH (ref 3.8–10.5)
WBC # BLD: 21.2 K/UL — HIGH (ref 3.8–10.5)
WBC # BLD: 21.99 K/UL — HIGH (ref 3.8–10.5)
WBC # FLD AUTO: 17.05 K/UL — HIGH (ref 3.8–10.5)
WBC # FLD AUTO: 17.88 K/UL — HIGH (ref 3.8–10.5)
WBC # FLD AUTO: 18.13 K/UL — HIGH (ref 3.8–10.5)
WBC # FLD AUTO: 18.54 K/UL — HIGH (ref 3.8–10.5)
WBC # FLD AUTO: 21.2 K/UL — HIGH (ref 3.8–10.5)
WBC # FLD AUTO: 21.99 K/UL — HIGH (ref 3.8–10.5)

## 2024-01-01 PROCEDURE — 93010 ELECTROCARDIOGRAM REPORT: CPT

## 2024-01-01 PROCEDURE — 71045 X-RAY EXAM CHEST 1 VIEW: CPT | Mod: 26,59

## 2024-01-01 PROCEDURE — 74018 RADEX ABDOMEN 1 VIEW: CPT | Mod: 26

## 2024-01-01 PROCEDURE — 99291 CRITICAL CARE FIRST HOUR: CPT

## 2024-01-01 PROCEDURE — 95816 EEG AWAKE AND DROWSY: CPT | Mod: 26

## 2024-01-01 PROCEDURE — 71045 X-RAY EXAM CHEST 1 VIEW: CPT | Mod: 26,76

## 2024-01-01 PROCEDURE — 99232 SBSQ HOSP IP/OBS MODERATE 35: CPT

## 2024-01-01 PROCEDURE — 36620 INSERTION CATHETER ARTERY: CPT

## 2024-01-01 PROCEDURE — 76937 US GUIDE VASCULAR ACCESS: CPT | Mod: 26

## 2024-01-01 PROCEDURE — 99223 1ST HOSP IP/OBS HIGH 75: CPT

## 2024-01-01 PROCEDURE — 93306 TTE W/DOPPLER COMPLETE: CPT | Mod: 26

## 2024-01-01 PROCEDURE — 99292 CRITICAL CARE ADDL 30 MIN: CPT

## 2024-01-01 PROCEDURE — 74177 CT ABD & PELVIS W/CONTRAST: CPT | Mod: 26,MC

## 2024-01-01 PROCEDURE — 70496 CT ANGIOGRAPHY HEAD: CPT | Mod: 26

## 2024-01-01 PROCEDURE — 71250 CT THORAX DX C-: CPT | Mod: 26

## 2024-01-01 PROCEDURE — 99222 1ST HOSP IP/OBS MODERATE 55: CPT

## 2024-01-01 PROCEDURE — 70450 CT HEAD/BRAIN W/O DYE: CPT | Mod: 26,MC

## 2024-01-01 PROCEDURE — 74018 RADEX ABDOMEN 1 VIEW: CPT | Mod: 26,59

## 2024-01-01 PROCEDURE — 70498 CT ANGIOGRAPHY NECK: CPT | Mod: 26

## 2024-01-01 PROCEDURE — 72131 CT LUMBAR SPINE W/O DYE: CPT | Mod: 26,MC

## 2024-01-01 RX ORDER — INSULIN HUMAN 100 [IU]/ML
5 INJECTION, SOLUTION SUBCUTANEOUS ONCE
Refills: 0 | Status: COMPLETED | OUTPATIENT
Start: 2024-01-01 | End: 2024-01-01

## 2024-01-01 RX ORDER — SODIUM CHLORIDE 9 MG/ML
1000 INJECTION, SOLUTION INTRAVENOUS
Refills: 0 | Status: COMPLETED | OUTPATIENT
Start: 2024-01-01 | End: 2024-01-01

## 2024-01-01 RX ORDER — INSULIN LISPRO 100/ML
8 VIAL (ML) SUBCUTANEOUS
Refills: 0 | Status: ACTIVE | OUTPATIENT
Start: 2024-01-01 | End: 2025-02-09

## 2024-01-01 RX ORDER — ACETAMINOPHEN 500 MG
1000 TABLET ORAL ONCE
Refills: 0 | Status: COMPLETED | OUTPATIENT
Start: 2024-01-01 | End: 2024-01-01

## 2024-01-01 RX ORDER — LANOLIN ALCOHOL/MO/W.PET/CERES
3 CREAM (GRAM) TOPICAL AT BEDTIME
Refills: 0 | Status: DISCONTINUED | OUTPATIENT
Start: 2024-01-01 | End: 2024-01-01

## 2024-01-01 RX ORDER — LACTULOSE 10 G/15ML
200 SOLUTION ORAL ONCE
Refills: 0 | Status: COMPLETED | OUTPATIENT
Start: 2024-01-01 | End: 2024-01-01

## 2024-01-01 RX ORDER — AMIODARONE HYDROCHLORIDE 400 MG/1
0.5 TABLET ORAL
Qty: 450 | Refills: 0 | Status: DISCONTINUED | OUTPATIENT
Start: 2024-01-01 | End: 2024-01-01

## 2024-01-01 RX ORDER — LACTULOSE 10 G/15ML
30 SOLUTION ORAL EVERY 6 HOURS
Refills: 0 | Status: DISCONTINUED | OUTPATIENT
Start: 2024-01-01 | End: 2024-01-01

## 2024-01-01 RX ORDER — METOPROLOL TARTRATE 50 MG
5 TABLET ORAL ONCE
Refills: 0 | Status: COMPLETED | OUTPATIENT
Start: 2024-01-01 | End: 2024-01-01

## 2024-01-01 RX ORDER — FUROSEMIDE 40 MG
40 TABLET ORAL ONCE
Refills: 0 | Status: COMPLETED | OUTPATIENT
Start: 2024-01-01 | End: 2024-01-01

## 2024-01-01 RX ORDER — OXYCODONE HYDROCHLORIDE 5 MG/1
2.5 TABLET ORAL EVERY 4 HOURS
Refills: 0 | Status: DISCONTINUED | OUTPATIENT
Start: 2024-01-01 | End: 2024-01-01

## 2024-01-01 RX ORDER — SODIUM CHLORIDE 9 MG/ML
1000 INJECTION INTRAMUSCULAR; INTRAVENOUS; SUBCUTANEOUS ONCE
Refills: 0 | Status: COMPLETED | OUTPATIENT
Start: 2024-01-01 | End: 2024-01-01

## 2024-01-01 RX ORDER — CALCIUM GLUCONATE 100 MG/ML
2 VIAL (ML) INTRAVENOUS ONCE
Refills: 0 | Status: COMPLETED | OUTPATIENT
Start: 2024-01-01 | End: 2024-01-01

## 2024-01-01 RX ORDER — INSULIN LISPRO 100/ML
VIAL (ML) SUBCUTANEOUS EVERY 6 HOURS
Refills: 0 | Status: DISCONTINUED | OUTPATIENT
Start: 2024-01-01 | End: 2024-01-01

## 2024-01-01 RX ORDER — LISINOPRIL 2.5 MG/1
1 TABLET ORAL
Qty: 0 | Refills: 0 | DISCHARGE

## 2024-01-01 RX ORDER — SODIUM CHLORIDE 9 MG/ML
10 INJECTION INTRAMUSCULAR; INTRAVENOUS; SUBCUTANEOUS
Refills: 0 | Status: DISCONTINUED | OUTPATIENT
Start: 2024-01-01 | End: 2024-01-01

## 2024-01-01 RX ORDER — GABAPENTIN 400 MG/1
100 CAPSULE ORAL EVERY 8 HOURS
Refills: 0 | Status: ACTIVE | OUTPATIENT
Start: 2024-01-01 | End: 2025-02-08

## 2024-01-01 RX ORDER — CHLORHEXIDINE GLUCONATE 213 G/1000ML
1 SOLUTION TOPICAL
Refills: 0 | Status: DISCONTINUED | OUTPATIENT
Start: 2024-01-01 | End: 2024-01-01

## 2024-01-01 RX ORDER — GLUCAGON INJECTION, SOLUTION 0.5 MG/.1ML
1 INJECTION, SOLUTION SUBCUTANEOUS ONCE
Refills: 0 | Status: DISCONTINUED | OUTPATIENT
Start: 2024-01-01 | End: 2024-01-01

## 2024-01-01 RX ORDER — MEROPENEM 1 G/30ML
1000 INJECTION INTRAVENOUS EVERY 12 HOURS
Refills: 0 | Status: DISCONTINUED | OUTPATIENT
Start: 2024-01-01 | End: 2024-01-01

## 2024-01-01 RX ORDER — NOREPINEPHRINE BITARTRATE/D5W 8 MG/250ML
0.05 PLASTIC BAG, INJECTION (ML) INTRAVENOUS
Qty: 32 | Refills: 0 | Status: DISCONTINUED | OUTPATIENT
Start: 2024-01-01 | End: 2024-01-01

## 2024-01-01 RX ORDER — CEFAZOLIN SODIUM 1 G
VIAL (EA) INJECTION
Refills: 0 | Status: DISCONTINUED | OUTPATIENT
Start: 2024-01-01 | End: 2024-01-01

## 2024-01-01 RX ORDER — AMIODARONE HYDROCHLORIDE 400 MG/1
150 TABLET ORAL ONCE
Refills: 0 | Status: COMPLETED | OUTPATIENT
Start: 2024-01-01 | End: 2024-01-01

## 2024-01-01 RX ORDER — KETOROLAC TROMETHAMINE 30 MG/ML
15 SYRINGE (ML) INJECTION ONCE
Refills: 0 | Status: DISCONTINUED | OUTPATIENT
Start: 2024-01-01 | End: 2024-01-01

## 2024-01-01 RX ORDER — DEXTROSE 50 % IN WATER 50 %
12.5 SYRINGE (ML) INTRAVENOUS ONCE
Refills: 0 | Status: DISCONTINUED | OUTPATIENT
Start: 2024-01-01 | End: 2024-01-01

## 2024-01-01 RX ORDER — INSULIN LISPRO 100/ML
5 VIAL (ML) SUBCUTANEOUS
Refills: 0 | Status: DISCONTINUED | OUTPATIENT
Start: 2024-01-01 | End: 2024-01-01

## 2024-01-01 RX ORDER — NALOXONE HYDROCHLORIDE 4 MG/.1ML
0.4 SPRAY NASAL ONCE
Refills: 0 | Status: DISCONTINUED | OUTPATIENT
Start: 2024-01-01 | End: 2024-01-01

## 2024-01-01 RX ORDER — FENTANYL CITRATE 50 UG/ML
0.5 INJECTION INTRAVENOUS
Qty: 2500 | Refills: 0 | Status: DISCONTINUED | OUTPATIENT
Start: 2024-01-01 | End: 2024-01-01

## 2024-01-01 RX ORDER — SODIUM CHLORIDE 9 MG/ML
500 INJECTION INTRAMUSCULAR; INTRAVENOUS; SUBCUTANEOUS ONCE
Refills: 0 | Status: COMPLETED | OUTPATIENT
Start: 2024-01-01 | End: 2024-01-01

## 2024-01-01 RX ORDER — ENOXAPARIN SODIUM 100 MG/ML
110 INJECTION SUBCUTANEOUS EVERY 12 HOURS
Refills: 0 | Status: DISCONTINUED | OUTPATIENT
Start: 2024-01-01 | End: 2024-01-01

## 2024-01-01 RX ORDER — MORPHINE SULFATE 50 MG/1
2 CAPSULE, EXTENDED RELEASE ORAL ONCE
Refills: 0 | Status: DISCONTINUED | OUTPATIENT
Start: 2024-01-01 | End: 2024-01-01

## 2024-01-01 RX ORDER — LISINOPRIL 2.5 MG/1
2.5 TABLET ORAL DAILY
Refills: 0 | Status: DISCONTINUED | OUTPATIENT
Start: 2024-01-01 | End: 2024-01-01

## 2024-01-01 RX ORDER — CEFAZOLIN SODIUM 1 G
2000 VIAL (EA) INJECTION ONCE
Refills: 0 | Status: COMPLETED | OUTPATIENT
Start: 2024-01-01 | End: 2024-01-01

## 2024-01-01 RX ORDER — DEXTROSE 50 % IN WATER 50 %
15 SYRINGE (ML) INTRAVENOUS ONCE
Refills: 0 | Status: DISCONTINUED | OUTPATIENT
Start: 2024-01-01 | End: 2024-01-01

## 2024-01-01 RX ORDER — DILTIAZEM HCL 120 MG
60 CAPSULE, EXT RELEASE 24 HR ORAL EVERY 8 HOURS
Refills: 0 | Status: DISCONTINUED | OUTPATIENT
Start: 2024-01-01 | End: 2024-01-01

## 2024-01-01 RX ORDER — DILTIAZEM HCL 120 MG
20 CAPSULE, EXT RELEASE 24 HR ORAL ONCE
Refills: 0 | Status: COMPLETED | OUTPATIENT
Start: 2024-01-01 | End: 2024-01-01

## 2024-01-01 RX ORDER — LACTULOSE 10 G/15ML
200 SOLUTION ORAL DAILY
Refills: 0 | Status: DISCONTINUED | OUTPATIENT
Start: 2024-01-01 | End: 2024-01-01

## 2024-01-01 RX ORDER — SODIUM BICARBONATE 1 MEQ/ML
50 SYRINGE (ML) INTRAVENOUS ONCE
Refills: 0 | Status: COMPLETED | OUTPATIENT
Start: 2024-01-01 | End: 2024-01-01

## 2024-01-01 RX ORDER — CYCLOBENZAPRINE HYDROCHLORIDE 10 MG/1
5 TABLET, FILM COATED ORAL EVERY 8 HOURS
Refills: 0 | Status: ACTIVE | OUTPATIENT
Start: 2024-01-01 | End: 2025-02-08

## 2024-01-01 RX ORDER — LACTULOSE 10 G/15ML
20 SOLUTION ORAL
Refills: 0 | Status: DISCONTINUED | OUTPATIENT
Start: 2024-01-01 | End: 2024-01-01

## 2024-01-01 RX ORDER — MULTIVIT WITH MIN/MFOLATE/K2 340-15/3 G
296 POWDER (GRAM) ORAL ONCE
Refills: 0 | Status: DISCONTINUED | OUTPATIENT
Start: 2024-01-01 | End: 2024-01-01

## 2024-01-01 RX ORDER — CISATRACURIUM BESYLATE 2 MG/ML
20 INJECTION INTRAVENOUS ONCE
Refills: 0 | Status: DISCONTINUED | OUTPATIENT
Start: 2024-01-01 | End: 2024-01-01

## 2024-01-01 RX ORDER — INSULIN LISPRO 100/ML
VIAL (ML) SUBCUTANEOUS
Refills: 0 | Status: ACTIVE | OUTPATIENT
Start: 2024-01-01 | End: 2025-02-08

## 2024-01-01 RX ORDER — INSULIN GLARGINE 100 [IU]/ML
15 INJECTION, SOLUTION SUBCUTANEOUS AT BEDTIME
Refills: 0 | Status: DISCONTINUED | OUTPATIENT
Start: 2024-01-01 | End: 2024-01-01

## 2024-01-01 RX ORDER — DILTIAZEM HCL 120 MG
10 CAPSULE, EXT RELEASE 24 HR ORAL ONCE
Refills: 0 | Status: COMPLETED | OUTPATIENT
Start: 2024-01-01 | End: 2024-01-01

## 2024-01-01 RX ORDER — DEXTROSE 50 % IN WATER 50 %
25 SYRINGE (ML) INTRAVENOUS ONCE
Refills: 0 | Status: DISCONTINUED | OUTPATIENT
Start: 2024-01-01 | End: 2024-01-01

## 2024-01-01 RX ORDER — SODIUM CHLORIDE 9 MG/ML
1000 INJECTION, SOLUTION INTRAVENOUS ONCE
Refills: 0 | Status: COMPLETED | OUTPATIENT
Start: 2024-01-01 | End: 2024-01-01

## 2024-01-01 RX ORDER — GLIMEPIRIDE 1 MG
1 TABLET ORAL
Qty: 0 | Refills: 0 | DISCHARGE

## 2024-01-01 RX ORDER — INSULIN LISPRO 100/ML
VIAL (ML) SUBCUTANEOUS AT BEDTIME
Refills: 0 | Status: ACTIVE | OUTPATIENT
Start: 2024-01-01 | End: 2025-02-08

## 2024-01-01 RX ORDER — ACETAMINOPHEN 500 MG
650 TABLET ORAL EVERY 6 HOURS
Refills: 0 | Status: DISCONTINUED | OUTPATIENT
Start: 2024-01-01 | End: 2024-01-01

## 2024-01-01 RX ORDER — PIPERACILLIN AND TAZOBACTAM 4; .5 G/20ML; G/20ML
3.38 INJECTION, POWDER, LYOPHILIZED, FOR SOLUTION INTRAVENOUS EVERY 8 HOURS
Refills: 0 | Status: DISCONTINUED | OUTPATIENT
Start: 2024-01-01 | End: 2024-01-01

## 2024-01-01 RX ORDER — SODIUM CHLORIDE 9 MG/ML
1000 INJECTION, SOLUTION INTRAVENOUS
Refills: 0 | Status: DISCONTINUED | OUTPATIENT
Start: 2024-01-01 | End: 2024-01-01

## 2024-01-01 RX ORDER — ONDANSETRON 8 MG/1
4 TABLET, FILM COATED ORAL EVERY 8 HOURS
Refills: 0 | Status: DISCONTINUED | OUTPATIENT
Start: 2024-01-01 | End: 2024-01-01

## 2024-01-01 RX ORDER — LACTULOSE 10 G/15ML
200 SOLUTION ORAL ONCE
Refills: 0 | Status: DISCONTINUED | OUTPATIENT
Start: 2024-01-01 | End: 2024-01-01

## 2024-01-01 RX ORDER — VASOPRESSIN 20 [USP'U]/ML
0.04 INJECTION INTRAVENOUS
Qty: 40 | Refills: 0 | Status: DISCONTINUED | OUTPATIENT
Start: 2024-01-01 | End: 2024-01-01

## 2024-01-01 RX ORDER — LACTULOSE 10 G/15ML
20 SOLUTION ORAL DAILY
Refills: 0 | Status: DISCONTINUED | OUTPATIENT
Start: 2024-01-01 | End: 2024-01-01

## 2024-01-01 RX ORDER — METOPROLOL TARTRATE 50 MG
25 TABLET ORAL ONCE
Refills: 0 | Status: COMPLETED | OUTPATIENT
Start: 2024-01-01 | End: 2024-01-01

## 2024-01-01 RX ORDER — FENTANYL CITRATE 50 UG/ML
50 INJECTION INTRAVENOUS ONCE
Refills: 0 | Status: DISCONTINUED | OUTPATIENT
Start: 2024-01-01 | End: 2024-01-01

## 2024-01-01 RX ORDER — DILTIAZEM HCL 120 MG
15 CAPSULE, EXT RELEASE 24 HR ORAL
Qty: 125 | Refills: 0 | Status: DISCONTINUED | OUTPATIENT
Start: 2024-01-01 | End: 2024-01-01

## 2024-01-01 RX ORDER — INSULIN HUMAN 100 [IU]/ML
8 INJECTION, SOLUTION SUBCUTANEOUS ONCE
Refills: 0 | Status: COMPLETED | OUTPATIENT
Start: 2024-01-01 | End: 2024-01-01

## 2024-01-01 RX ORDER — AMIODARONE HYDROCHLORIDE 400 MG/1
1 TABLET ORAL
Qty: 450 | Refills: 0 | Status: DISCONTINUED | OUTPATIENT
Start: 2024-01-01 | End: 2024-01-01

## 2024-01-01 RX ORDER — ATORVASTATIN CALCIUM 80 MG/1
40 TABLET, FILM COATED ORAL AT BEDTIME
Refills: 0 | Status: ACTIVE | OUTPATIENT
Start: 2024-01-01 | End: 2025-02-08

## 2024-01-01 RX ORDER — CEFAZOLIN SODIUM 1 G
2000 VIAL (EA) INJECTION EVERY 8 HOURS
Refills: 0 | Status: DISCONTINUED | OUTPATIENT
Start: 2024-01-01 | End: 2024-01-01

## 2024-01-01 RX ORDER — INSULIN GLARGINE 100 [IU]/ML
26 INJECTION, SOLUTION SUBCUTANEOUS AT BEDTIME
Refills: 0 | Status: DISCONTINUED | OUTPATIENT
Start: 2024-01-01 | End: 2024-01-01

## 2024-01-01 RX ORDER — SENNA PLUS 8.6 MG/1
2 TABLET ORAL AT BEDTIME
Refills: 0 | Status: DISCONTINUED | OUTPATIENT
Start: 2024-01-01 | End: 2024-01-01

## 2024-01-01 RX ORDER — IPRATROPIUM/ALBUTEROL SULFATE 18-103MCG
3 AEROSOL WITH ADAPTER (GRAM) INHALATION ONCE
Refills: 0 | Status: DISCONTINUED | OUTPATIENT
Start: 2024-01-01 | End: 2024-01-01

## 2024-01-01 RX ORDER — LIDOCAINE 4 G/100G
1 CREAM TOPICAL DAILY
Refills: 0 | Status: ACTIVE | OUTPATIENT
Start: 2024-01-01 | End: 2025-02-08

## 2024-01-01 RX ORDER — LACTULOSE 10 G/15ML
20 SOLUTION ORAL EVERY 6 HOURS
Refills: 0 | Status: DISCONTINUED | OUTPATIENT
Start: 2024-01-01 | End: 2024-01-01

## 2024-01-01 RX ORDER — HYDROCORTISONE 20 MG
100 TABLET ORAL ONCE
Refills: 0 | Status: DISCONTINUED | OUTPATIENT
Start: 2024-01-01 | End: 2024-01-01

## 2024-01-01 RX ORDER — DILTIAZEM HCL 120 MG
28 CAPSULE, EXT RELEASE 24 HR ORAL ONCE
Refills: 0 | Status: COMPLETED | OUTPATIENT
Start: 2024-01-01 | End: 2024-01-01

## 2024-01-01 RX ORDER — DILTIAZEM HCL 120 MG
28 CAPSULE, EXT RELEASE 24 HR ORAL ONCE
Refills: 0 | Status: DISCONTINUED | OUTPATIENT
Start: 2024-01-01 | End: 2024-01-01

## 2024-01-01 RX ORDER — HYDROCORTISONE 20 MG
100 TABLET ORAL EVERY 8 HOURS
Refills: 0 | Status: DISCONTINUED | OUTPATIENT
Start: 2024-01-01 | End: 2024-01-01

## 2024-01-01 RX ORDER — CYCLOBENZAPRINE HYDROCHLORIDE 10 MG/1
0 TABLET, FILM COATED ORAL
Qty: 0 | Refills: 0 | DISCHARGE

## 2024-01-01 RX ORDER — ACETAMINOPHEN 500 MG
975 TABLET ORAL EVERY 6 HOURS
Refills: 0 | Status: ACTIVE | OUTPATIENT
Start: 2024-01-01 | End: 2025-02-09

## 2024-01-01 RX ORDER — SODIUM BICARBONATE 1 MEQ/ML
0.14 SYRINGE (ML) INTRAVENOUS
Qty: 150 | Refills: 0 | Status: DISCONTINUED | OUTPATIENT
Start: 2024-01-01 | End: 2024-01-01

## 2024-01-01 RX ORDER — METOPROLOL TARTRATE 50 MG
50 TABLET ORAL
Refills: 0 | Status: DISCONTINUED | OUTPATIENT
Start: 2024-01-01 | End: 2024-01-01

## 2024-01-01 RX ORDER — IPRATROPIUM/ALBUTEROL SULFATE 18-103MCG
3 AEROSOL WITH ADAPTER (GRAM) INHALATION EVERY 6 HOURS
Refills: 0 | Status: DISCONTINUED | OUTPATIENT
Start: 2024-01-01 | End: 2024-01-01

## 2024-01-01 RX ORDER — DEXTROSE 50 % IN WATER 50 %
50 SYRINGE (ML) INTRAVENOUS ONCE
Refills: 0 | Status: COMPLETED | OUTPATIENT
Start: 2024-01-01 | End: 2024-01-01

## 2024-01-01 RX ORDER — INSULIN LISPRO 100/ML
VIAL (ML) SUBCUTANEOUS EVERY 4 HOURS
Refills: 0 | Status: DISCONTINUED | OUTPATIENT
Start: 2024-01-01 | End: 2024-01-01

## 2024-01-01 RX ORDER — CHLORHEXIDINE GLUCONATE 213 G/1000ML
15 SOLUTION TOPICAL EVERY 12 HOURS
Refills: 0 | Status: DISCONTINUED | OUTPATIENT
Start: 2024-01-01 | End: 2024-01-01

## 2024-01-01 RX ORDER — CALCIUM GLUCONATE 100 MG/ML
2 VIAL (ML) INTRAVENOUS ONCE
Refills: 0 | Status: DISCONTINUED | OUTPATIENT
Start: 2024-01-01 | End: 2024-01-01

## 2024-01-01 RX ORDER — VANCOMYCIN HCL 1 G
1250 VIAL (EA) INTRAVENOUS EVERY 12 HOURS
Refills: 0 | Status: DISCONTINUED | OUTPATIENT
Start: 2024-01-01 | End: 2024-01-01

## 2024-01-01 RX ORDER — INSULIN GLARGINE 100 [IU]/ML
10 INJECTION, SOLUTION SUBCUTANEOUS ONCE
Refills: 0 | Status: COMPLETED | OUTPATIENT
Start: 2024-01-01 | End: 2024-01-01

## 2024-01-01 RX ORDER — CISATRACURIUM BESYLATE 2 MG/ML
3 INJECTION INTRAVENOUS
Qty: 200 | Refills: 0 | Status: DISCONTINUED | OUTPATIENT
Start: 2024-01-01 | End: 2024-01-01

## 2024-01-01 RX ORDER — VANCOMYCIN HCL 1 G
1000 VIAL (EA) INTRAVENOUS ONCE
Refills: 0 | Status: COMPLETED | OUTPATIENT
Start: 2024-01-01 | End: 2024-01-01

## 2024-01-01 RX ORDER — PIPERACILLIN AND TAZOBACTAM 4; .5 G/20ML; G/20ML
3.38 INJECTION, POWDER, LYOPHILIZED, FOR SOLUTION INTRAVENOUS ONCE
Refills: 0 | Status: COMPLETED | OUTPATIENT
Start: 2024-01-01 | End: 2024-01-01

## 2024-01-01 RX ORDER — KETOROLAC TROMETHAMINE 30 MG/ML
30 SYRINGE (ML) INJECTION ONCE
Refills: 0 | Status: DISCONTINUED | OUTPATIENT
Start: 2024-01-01 | End: 2024-01-01

## 2024-01-01 RX ORDER — DILTIAZEM HCL 120 MG
90 CAPSULE, EXT RELEASE 24 HR ORAL ONCE
Refills: 0 | Status: COMPLETED | OUTPATIENT
Start: 2024-01-01 | End: 2024-01-01

## 2024-01-01 RX ORDER — POLYETHYLENE GLYCOL 3350 17 G/17G
17 POWDER, FOR SOLUTION ORAL DAILY
Refills: 0 | Status: DISCONTINUED | OUTPATIENT
Start: 2024-01-01 | End: 2024-01-01

## 2024-01-01 RX ORDER — ATORVASTATIN CALCIUM 80 MG/1
1 TABLET, FILM COATED ORAL
Qty: 0 | Refills: 0 | DISCHARGE

## 2024-01-01 RX ORDER — DILTIAZEM HCL 120 MG
40 CAPSULE, EXT RELEASE 24 HR ORAL ONCE
Refills: 0 | Status: DISCONTINUED | OUTPATIENT
Start: 2024-01-01 | End: 2024-01-01

## 2024-01-01 RX ORDER — OXYCODONE HYDROCHLORIDE 5 MG/1
5 TABLET ORAL EVERY 4 HOURS
Refills: 0 | Status: DISCONTINUED | OUTPATIENT
Start: 2024-01-01 | End: 2024-01-01

## 2024-01-01 RX ORDER — METOPROLOL TARTRATE 50 MG
25 TABLET ORAL
Refills: 0 | Status: DISCONTINUED | OUTPATIENT
Start: 2024-01-01 | End: 2024-01-01

## 2024-01-01 RX ADMIN — Medication 60 MILLIGRAM(S): at 05:57

## 2024-01-01 RX ADMIN — Medication 10: at 09:00

## 2024-01-01 RX ADMIN — MORPHINE SULFATE 2 MILLIGRAM(S): 50 CAPSULE, EXTENDED RELEASE ORAL at 23:53

## 2024-01-01 RX ADMIN — Medication 1000 MILLIGRAM(S): at 14:05

## 2024-01-01 RX ADMIN — Medication 125 MILLIGRAM(S): at 01:24

## 2024-01-01 RX ADMIN — LIDOCAINE 1 PATCH: 4 CREAM TOPICAL at 19:57

## 2024-01-01 RX ADMIN — LACTULOSE 200 GRAM(S): 10 SOLUTION ORAL at 18:50

## 2024-01-01 RX ADMIN — SODIUM CHLORIDE 1000 MILLILITER(S): 9 INJECTION INTRAMUSCULAR; INTRAVENOUS; SUBCUTANEOUS at 14:34

## 2024-01-01 RX ADMIN — AMIODARONE HYDROCHLORIDE 618 MILLIGRAM(S): 400 TABLET ORAL at 08:49

## 2024-01-01 RX ADMIN — AMIODARONE HYDROCHLORIDE 33.3 MG/MIN: 400 TABLET ORAL at 11:33

## 2024-01-01 RX ADMIN — OXYCODONE HYDROCHLORIDE 5 MILLIGRAM(S): 5 TABLET ORAL at 05:42

## 2024-01-01 RX ADMIN — ENOXAPARIN SODIUM 110 MILLIGRAM(S): 100 INJECTION SUBCUTANEOUS at 18:05

## 2024-01-01 RX ADMIN — SODIUM CHLORIDE 1000 MILLILITER(S): 9 INJECTION INTRAMUSCULAR; INTRAVENOUS; SUBCUTANEOUS at 13:34

## 2024-01-01 RX ADMIN — ATORVASTATIN CALCIUM 40 MILLIGRAM(S): 80 TABLET, FILM COATED ORAL at 22:34

## 2024-01-01 RX ADMIN — Medication 8 UNIT(S): at 08:04

## 2024-01-01 RX ADMIN — Medication 1000 MILLIGRAM(S): at 08:30

## 2024-01-01 RX ADMIN — OXYCODONE HYDROCHLORIDE 5 MILLIGRAM(S): 5 TABLET ORAL at 06:42

## 2024-01-01 RX ADMIN — LACTULOSE 30 GRAM(S): 10 SOLUTION ORAL at 05:17

## 2024-01-01 RX ADMIN — LACTULOSE 30 GRAM(S): 10 SOLUTION ORAL at 00:10

## 2024-01-01 RX ADMIN — GABAPENTIN 100 MILLIGRAM(S): 400 CAPSULE ORAL at 05:43

## 2024-01-01 RX ADMIN — Medication 1000 MILLIGRAM(S): at 04:11

## 2024-01-01 RX ADMIN — PIPERACILLIN AND TAZOBACTAM 25 GRAM(S): 4; .5 INJECTION, POWDER, LYOPHILIZED, FOR SOLUTION INTRAVENOUS at 05:42

## 2024-01-01 RX ADMIN — Medication 50 MILLIEQUIVALENT(S): at 08:06

## 2024-01-01 RX ADMIN — Medication 12: at 21:46

## 2024-01-01 RX ADMIN — GABAPENTIN 100 MILLIGRAM(S): 400 CAPSULE ORAL at 05:18

## 2024-01-01 RX ADMIN — Medication 30 MILLIGRAM(S): at 11:33

## 2024-01-01 RX ADMIN — MORPHINE SULFATE 2 MILLIGRAM(S): 50 CAPSULE, EXTENDED RELEASE ORAL at 00:00

## 2024-01-01 RX ADMIN — INSULIN HUMAN 8 UNIT(S): 100 INJECTION, SOLUTION SUBCUTANEOUS at 17:53

## 2024-01-01 RX ADMIN — Medication 15 MILLIGRAM(S): at 16:53

## 2024-01-01 RX ADMIN — LACTULOSE 30 GRAM(S): 10 SOLUTION ORAL at 13:50

## 2024-01-01 RX ADMIN — MORPHINE SULFATE 2 MILLIGRAM(S): 50 CAPSULE, EXTENDED RELEASE ORAL at 22:00

## 2024-01-01 RX ADMIN — Medication 8: at 08:03

## 2024-01-01 RX ADMIN — LISINOPRIL 2.5 MILLIGRAM(S): 2.5 TABLET ORAL at 18:06

## 2024-01-01 RX ADMIN — Medication 10: at 01:45

## 2024-01-01 RX ADMIN — Medication 5 MILLIGRAM(S): at 18:04

## 2024-01-01 RX ADMIN — LACTULOSE 200 GRAM(S): 10 SOLUTION ORAL at 18:41

## 2024-01-01 RX ADMIN — Medication 4.96 MICROGRAM(S)/KG/MIN: at 11:45

## 2024-01-01 RX ADMIN — INSULIN GLARGINE 26 UNIT(S): 100 INJECTION, SOLUTION SUBCUTANEOUS at 21:45

## 2024-01-01 RX ADMIN — Medication 15 MG/HR: at 08:29

## 2024-01-01 RX ADMIN — LACTULOSE 200 GRAM(S): 10 SOLUTION ORAL at 05:19

## 2024-01-01 RX ADMIN — Medication 60 MILLIGRAM(S): at 18:40

## 2024-01-01 RX ADMIN — Medication 200 GRAM(S): at 10:19

## 2024-01-01 RX ADMIN — Medication 400 MILLIGRAM(S): at 13:33

## 2024-01-01 RX ADMIN — Medication 15 MILLIGRAM(S): at 15:53

## 2024-01-01 RX ADMIN — CISATRACURIUM BESYLATE 19 MICROGRAM(S)/KG/MIN: 2 INJECTION INTRAVENOUS at 08:50

## 2024-01-01 RX ADMIN — Medication 100 MILLIGRAM(S): at 21:44

## 2024-01-01 RX ADMIN — Medication 10 MG/HR: at 04:47

## 2024-01-01 RX ADMIN — Medication 1000 MILLIGRAM(S): at 16:35

## 2024-01-01 RX ADMIN — Medication 400 MILLIGRAM(S): at 08:29

## 2024-01-01 RX ADMIN — MORPHINE SULFATE 2 MILLIGRAM(S): 50 CAPSULE, EXTENDED RELEASE ORAL at 21:44

## 2024-01-01 RX ADMIN — SENNA PLUS 2 TABLET(S): 8.6 TABLET ORAL at 22:34

## 2024-01-01 RX ADMIN — Medication 50 MILLILITER(S): at 09:58

## 2024-01-01 RX ADMIN — Medication 400 MILLIGRAM(S): at 18:03

## 2024-01-01 RX ADMIN — PIPERACILLIN AND TAZOBACTAM 200 GRAM(S): 4; .5 INJECTION, POWDER, LYOPHILIZED, FOR SOLUTION INTRAVENOUS at 13:58

## 2024-01-01 RX ADMIN — Medication 40 MILLIGRAM(S): at 02:18

## 2024-01-01 RX ADMIN — AMIODARONE HYDROCHLORIDE 16.7 MG/MIN: 400 TABLET ORAL at 19:20

## 2024-01-01 RX ADMIN — Medication 8 UNIT(S): at 17:09

## 2024-01-01 RX ADMIN — VASOPRESSIN 6 UNIT(S)/MIN: 20 INJECTION INTRAVENOUS at 06:41

## 2024-01-01 RX ADMIN — SODIUM CHLORIDE 500 MILLILITER(S): 9 INJECTION INTRAMUSCULAR; INTRAVENOUS; SUBCUTANEOUS at 16:53

## 2024-01-01 RX ADMIN — PIPERACILLIN AND TAZOBACTAM 3.38 GRAM(S): 4; .5 INJECTION, POWDER, LYOPHILIZED, FOR SOLUTION INTRAVENOUS at 14:28

## 2024-01-01 RX ADMIN — Medication 100 MEQ/KG/HR: at 08:45

## 2024-01-01 RX ADMIN — GABAPENTIN 100 MILLIGRAM(S): 400 CAPSULE ORAL at 22:34

## 2024-01-01 RX ADMIN — GABAPENTIN 100 MILLIGRAM(S): 400 CAPSULE ORAL at 14:57

## 2024-01-01 RX ADMIN — GABAPENTIN 100 MILLIGRAM(S): 400 CAPSULE ORAL at 22:41

## 2024-01-01 RX ADMIN — Medication 60 MILLIGRAM(S): at 22:40

## 2024-01-01 RX ADMIN — CHLORHEXIDINE GLUCONATE 15 MILLILITER(S): 213 SOLUTION TOPICAL at 06:40

## 2024-01-01 RX ADMIN — FENTANYL CITRATE 5.29 MICROGRAM(S)/KG/HR: 50 INJECTION INTRAVENOUS at 08:56

## 2024-01-01 RX ADMIN — Medication 4.96 MICROGRAM(S)/KG/MIN: at 08:22

## 2024-01-01 RX ADMIN — Medication 8: at 12:19

## 2024-01-01 RX ADMIN — SODIUM CHLORIDE 500 MILLILITER(S): 9 INJECTION INTRAMUSCULAR; INTRAVENOUS; SUBCUTANEOUS at 15:53

## 2024-01-01 RX ADMIN — Medication 1000 MILLIGRAM(S): at 14:33

## 2024-01-01 RX ADMIN — LISINOPRIL 2.5 MILLIGRAM(S): 2.5 TABLET ORAL at 05:18

## 2024-01-01 RX ADMIN — LACTULOSE 20 GRAM(S): 10 SOLUTION ORAL at 12:23

## 2024-01-01 RX ADMIN — Medication 400 MILLIGRAM(S): at 03:41

## 2024-01-01 RX ADMIN — Medication 4.96 MICROGRAM(S)/KG/MIN: at 12:53

## 2024-01-01 RX ADMIN — Medication 90 MILLIGRAM(S): at 17:50

## 2024-01-01 RX ADMIN — Medication 4.96 MICROGRAM(S)/KG/MIN: at 06:44

## 2024-01-01 RX ADMIN — SODIUM CHLORIDE 1000 MILLILITER(S): 9 INJECTION INTRAMUSCULAR; INTRAVENOUS; SUBCUTANEOUS at 17:58

## 2024-01-01 RX ADMIN — INSULIN GLARGINE 15 UNIT(S): 100 INJECTION, SOLUTION SUBCUTANEOUS at 22:53

## 2024-01-01 RX ADMIN — Medication 25 MILLIGRAM(S): at 12:23

## 2024-01-01 RX ADMIN — Medication 10 MILLIGRAM(S): at 18:51

## 2024-01-01 RX ADMIN — Medication 100 MILLIGRAM(S): at 06:40

## 2024-01-01 RX ADMIN — ENOXAPARIN SODIUM 110 MILLIGRAM(S): 100 INJECTION SUBCUTANEOUS at 06:39

## 2024-01-01 RX ADMIN — INSULIN GLARGINE 10 UNIT(S): 100 INJECTION, SOLUTION SUBCUTANEOUS at 13:03

## 2024-01-01 RX ADMIN — AMIODARONE HYDROCHLORIDE 16.7 MG/MIN: 400 TABLET ORAL at 19:00

## 2024-01-01 RX ADMIN — ENOXAPARIN SODIUM 110 MILLIGRAM(S): 100 INJECTION SUBCUTANEOUS at 22:52

## 2024-01-01 RX ADMIN — LACTULOSE 30 GRAM(S): 10 SOLUTION ORAL at 18:05

## 2024-01-01 RX ADMIN — Medication 6: at 06:41

## 2024-01-01 RX ADMIN — Medication 20 MILLIGRAM(S): at 17:58

## 2024-01-01 RX ADMIN — Medication 5 MILLIGRAM(S): at 07:46

## 2024-01-01 RX ADMIN — CHLORHEXIDINE GLUCONATE 1 APPLICATION(S): 213 SOLUTION TOPICAL at 11:08

## 2024-01-01 RX ADMIN — INSULIN HUMAN 5 UNIT(S): 100 INJECTION, SOLUTION SUBCUTANEOUS at 09:57

## 2024-01-01 RX ADMIN — Medication 8: at 11:40

## 2024-01-01 RX ADMIN — Medication 20 MILLIGRAM(S): at 15:19

## 2024-01-01 RX ADMIN — Medication 25 MILLIGRAM(S): at 22:54

## 2024-01-01 RX ADMIN — Medication 6: at 17:09

## 2024-01-01 RX ADMIN — Medication 20 MILLIGRAM(S): at 04:15

## 2024-01-01 RX ADMIN — Medication 4.96 MICROGRAM(S)/KG/MIN: at 09:44

## 2024-01-01 RX ADMIN — Medication 100 MILLIGRAM(S): at 05:57

## 2024-01-01 RX ADMIN — Medication 25 MILLIGRAM(S): at 05:42

## 2024-01-01 RX ADMIN — Medication 100 MILLIGRAM(S): at 12:41

## 2024-01-01 RX ADMIN — LISINOPRIL 2.5 MILLIGRAM(S): 2.5 TABLET ORAL at 22:52

## 2024-01-01 RX ADMIN — Medication 28 MILLIGRAM(S): at 15:53

## 2024-01-01 RX ADMIN — ENOXAPARIN SODIUM 110 MILLIGRAM(S): 100 INJECTION SUBCUTANEOUS at 05:58

## 2024-01-01 RX ADMIN — Medication 30 MILLIGRAM(S): at 12:26

## 2024-01-01 RX ADMIN — LIDOCAINE 1 PATCH: 4 CREAM TOPICAL at 07:18

## 2024-01-01 RX ADMIN — SODIUM CHLORIDE 2000 MILLILITER(S): 9 INJECTION, SOLUTION INTRAVENOUS at 08:45

## 2024-01-01 RX ADMIN — PIPERACILLIN AND TAZOBACTAM 25 GRAM(S): 4; .5 INJECTION, POWDER, LYOPHILIZED, FOR SOLUTION INTRAVENOUS at 18:42

## 2024-01-01 RX ADMIN — CHLORHEXIDINE GLUCONATE 1 APPLICATION(S): 213 SOLUTION TOPICAL at 06:42

## 2024-01-01 RX ADMIN — LIDOCAINE 1 PATCH: 4 CREAM TOPICAL at 18:05

## 2024-01-01 RX ADMIN — Medication 6: at 22:52

## 2024-01-01 RX ADMIN — Medication 12: at 20:24

## 2024-01-01 RX ADMIN — Medication 8 UNIT(S): at 12:19

## 2024-01-01 RX ADMIN — LACTULOSE 30 GRAM(S): 10 SOLUTION ORAL at 23:53

## 2024-01-01 RX ADMIN — Medication 400 MILLIGRAM(S): at 09:27

## 2024-01-01 RX ADMIN — Medication 100 MILLIGRAM(S): at 22:41

## 2024-01-01 RX ADMIN — SODIUM CHLORIDE 2000 MILLILITER(S): 9 INJECTION, SOLUTION INTRAVENOUS at 09:45

## 2024-01-01 RX ADMIN — ATORVASTATIN CALCIUM 40 MILLIGRAM(S): 80 TABLET, FILM COATED ORAL at 22:41

## 2024-01-01 RX ADMIN — Medication 5 MILLIGRAM(S): at 19:34

## 2024-01-01 RX ADMIN — POLYETHYLENE GLYCOL 3350 17 GRAM(S): 17 POWDER, FOR SOLUTION ORAL at 12:20

## 2024-01-01 RX ADMIN — ENOXAPARIN SODIUM 110 MILLIGRAM(S): 100 INJECTION SUBCUTANEOUS at 18:59

## 2024-01-01 RX ADMIN — Medication 5 MILLIGRAM(S): at 03:19

## 2024-01-01 RX ADMIN — Medication 400 MILLIGRAM(S): at 15:04

## 2024-01-01 RX ADMIN — SODIUM CHLORIDE 1000 MILLILITER(S): 9 INJECTION, SOLUTION INTRAVENOUS at 16:55

## 2024-01-01 RX ADMIN — Medication 250 MILLIGRAM(S): at 15:35

## 2024-01-01 RX ADMIN — ENOXAPARIN SODIUM 110 MILLIGRAM(S): 100 INJECTION SUBCUTANEOUS at 09:04

## 2024-01-01 RX ADMIN — Medication 1000 MILLIGRAM(S): at 16:20

## 2024-01-01 RX ADMIN — LACTULOSE 30 GRAM(S): 10 SOLUTION ORAL at 18:52

## 2024-03-12 PROBLEM — E66.01 MORBID (SEVERE) OBESITY DUE TO EXCESS CALORIES: Chronic | Status: ACTIVE | Noted: 2021-08-30

## 2024-03-12 PROBLEM — K43.6 OTHER AND UNSPECIFIED VENTRAL HERNIA WITH OBSTRUCTION, WITHOUT GANGRENE: Chronic | Status: ACTIVE | Noted: 2021-08-30

## 2024-03-12 PROBLEM — E11.9 TYPE 2 DIABETES MELLITUS WITHOUT COMPLICATIONS: Chronic | Status: ACTIVE | Noted: 2021-08-30

## 2024-03-12 NOTE — H&P ADULT - HISTORY OF PRESENT ILLNESS
58M w/ hx morbid obese (BMI 39) diabetes, HTN presenting with hyperglycemia. Daughter reports for past 2w pt has not been acting himself, generalized weakness. Yesterday fell a couple times after which he had difficulty moving RLE. Poor appetite. +abd pain diffusely, none currently No fever, cp, sob, n/v. Hx alcohol used but stopped 2yr ago.      Pt endorses pain in his lower back and his shoulder which isn't new but worse than normal. Pt endorses passing gas but is constipated last BM was 3 days ago. Says he was taking oxycodone and that might be the cause. Does endorse a generalized abdominal pain due to distension. In ED pt originally with afib. s/p cardizem now sinus.

## 2024-03-12 NOTE — H&P ADULT - NSHPLABSRESULTS_GEN_ALL_CORE
=======================================================  Labs:                        15.4   21.20 )-----------( [id number]      ( 12 Mar 2024 12:15 )             45.4     03-12    132<L>  |  99  |  40<H>  ----------------------------<  430<H>  3.9   |  16<L>  |  1.11    Ca    9.4      12 Mar 2024 15:27    TPro  8.7<H>  /  Alb  3.0<L>  /  TBili  0.6  /  DBili  x   /  AST  23  /  ALT  46  /  AlkPhos  139<H>  03-12      Creatinine: 1.11 mg/dL (03-12-24 @ 15:27)  Creatinine: 1.46 mg/dL (03-12-24 @ 12:15)            WBC Count: 21.20 K/uL (03-12-24 @ 12:15)    SARS-CoV-2: NotDetec (03-12-24 @ 18:21)      Alkaline Phosphatase: 139 U/L (03-12-24 @ 12:15)  Alanine Aminotransferase (ALT/SGPT): 46 U/L (03-12-24 @ 12:15)  Aspartate Aminotransferase (AST/SGOT): 23 U/L (03-12-24 @ 12:15)  Bilirubin Total: 0.6 mg/dL (03-12-24 @ 12:15)      < from: Xray Chest 1 View AP/PA (04.21.10 @ 20:16) >      IMPRESSION:    Multiple old left rib fractures      < end of copied text >    < from: CT Head No Cont (03.12.24 @ 14:49) >      No acute intracranial  hemorrhage, mass effect or calvarial fracture.      < end of copied text >    < from: CT Lumbar Spine No Cont (03.12.24 @ 14:52) >      IMPRESSION:    No fracture. Grade 1 retrolisthesis of L2-L5.  Lower lumbar disc bulging.    < end of copied text >    < from: CT Abdomen and Pelvis w/ IV Cont (03.12.24 @ 14:58) >    IMPRESSION:  Small bilateral pleural effusions.    Constipated colon, most pronounced in the cecum.    Gastric air-fluid level. Correlate clinically for gastroparesis or   partial gastric outlet obstruction. Single prominent perigastric node   measuring 8 mm.    Hepatic steatosis.    < end of copied text >    < from: CT Chest No Cont (03.12.24 @ 17:19) >    IMPRESSION:    Mild bibasilar atelectasis. The lungs are otherwise clear.  < from: CT Angio Neck w/ IV Cont (03.12.24 @ 17:24) >    CTA brain:  No flow-limiting stenosis or vascular aneurysm. No AVM.  Venous system is well opacified, no evidence for venous sinus or cortical   vein thrombosis.    CTA neck:  No flow-limiting stenosis, evidence for arterial dissection, or vascular   aneurysm.    < end of copied text >

## 2024-03-12 NOTE — H&P ADULT - NSHPREVIEWOFSYSTEMS_GEN_ALL_CORE
REVIEW OF SYSTEMS:    CONSTITUTIONAL: +weakness, +fevers or chills  EYES/ENT: No visual changes;  No vertigo or throat pain   NECK: No pain or stiffness  RESPIRATORY: No cough, wheezing, hemoptysis; No shortness of breath  CARDIOVASCULAR: No chest pain or palpitations  GASTROINTESTINAL: +abdominal or epigastric pain. - nausea, vomiting, or hematemesis; No diarrhea +constipation. No melena or hematochezia.  GENITOURINARY: No dysuria, frequency or hematuria  NEUROLOGICAL: No numbness +weakness   SKIN: No itching, rashes

## 2024-03-12 NOTE — ED PROVIDER NOTE - ATTENDING CONTRIBUTION TO CARE
59yo male with pmh dm, htn, presenting with hyperglycemia.  Daughter at bedside.  Notes he has been slightly off and confused over past 2 weeks.  Patient had several falls yesterday and resultant difficulty moving RLE but also having generalized weakness.  Daughter had gone away for the weekend and when she found him today brought to ED.  Patient arrives here hyperglycemic tachycardic in new onset afib with fever.  Sepsis labs and cultures sent, will start antibiotics.  Unknown lkn but likely >24h, sill image back and head.  Will hold rate control initially given fever and dry, will hydrate, give antipyretics and rate control if needed after.  Will need admission.    Upon my evaluation, this patient had a high probability of imminent or life-threatening deterioration due to sepsis, afib with rvr, which required my direct attention, intervention, and personal management.  The patient has a  medical condition that impairs one or more vital organ systems.  Frequent personal assessment and adjustment of medical interventions was performed.      I have personally provided 90 minutes of critical care time exclusive of time spent on separately billable procedures. Time includes review of laboratory data, radiology results, discussion with consultants, patient and family; monitoring for potential decompensation, as well as time spent retrieving data and reviewing the chart and documenting the visit. Interventions were performed as documented above.

## 2024-03-12 NOTE — ED PROVIDER NOTE - CLINICAL SUMMARY MEDICAL DECISION MAKING FREE TEXT BOX
58M w/ hx morbid obese (BMI 39) diabetes, HTN presenting with hyperglycemia. Daughter reports for past 2w pt has not been acting himself, generalized weakness. Yesterday fell a couple times after which he had difficulty moving RLE. Poor appetite. +abd pain diffusely, none currently No fever, cp, sob, n/v. Hx alcohol used but stopped 2yr ago. Exam shows tremulous tachycardic pt. febrile. BP and sats stable. RLE unable to lift. c/f DKA, sepsis, lumbar trauma. Will get labs, CT, ivf, abx

## 2024-03-12 NOTE — ED PROVIDER NOTE - PHYSICAL EXAMINATION
General appearance: NAD, conversant, febrile    Eyes: anicteric sclerae, LOUANN, EOMI   HENT: Atraumatic; oropharynx clear, MMM and no ulcerations, no pharyngeal erythema or exudate   Neck: Trachea midline; Full range of motion, supple   Pulm: CTA bl, normal respiratory effort and no intercostal retractions, normal work of breathing   CV: tachycardic, No murmurs, rubs, or gallops.    Abdomen: Soft, non-tender, non-distended; no guarding or rebound; rectal tone present- chaperone Dr. Fournier   Extremities: tremulous, unable to lift RLE. 3/5 LLE. No peripheral edema or extremity lymphadenopathy.    Skin: Dry, normal temperature, turgor and texture; no rash, ulcers or subcutaneous nodules   Psych: cooperative; alert and oriented to person, place and time

## 2024-03-12 NOTE — ED ADULT NURSE NOTE - NSFALLRISKINTERV_ED_ALL_ED

## 2024-03-12 NOTE — PATIENT PROFILE ADULT - FALL HARM RISK - RISK INTERVENTIONS

## 2024-03-12 NOTE — ED PROVIDER NOTE - PROGRESS NOTE DETAILS
[facility]- pt HR improving to 150's. pt still mentating well HR improved after diltiazem, appears sinus on monitor, checking repeat ecg.  Patient remains clinically stable, improving symptoms.  Still with weakness mainly in RLE but b/l LE strength has been improving.  Added angio imaging of head and will admit.  Lumbar with no fx.  Patient with normal rectal tone on exam.  Labs improving with no gap, no longer acidotic.  Giving insulin for hyperglycemia. Spoke with Dr. Sim, cardiology, for consult.

## 2024-03-12 NOTE — H&P ADULT - NSHPPHYSICALEXAM_GEN_ALL_CORE
VITALS:   T(C): 36.6 (03-12-24 @ 21:15), Max: 38.4 (03-12-24 @ 13:23)  HR: 110 (03-12-24 @ 21:15) (107 - 165)  BP: 162/95 (03-12-24 @ 21:15) (146/88 - 169/102)  RR: 20 (03-12-24 @ 21:15) (20 - 29)  SpO2: 96% (03-12-24 @ 21:15) (95% - 99%)    GENERAL: NAD, lying in bed comfortably  HEAD:  Atraumatic, Normocephalic  EYES: EOMI, PERRLA, conjunctiva and sclera clear  ENT: Moist mucous membranes  NECK: Supple, No JVD  CHEST/LUNG: Clear to auscultation bilaterally; No rales, rhonchi, wheezing, or rubs. Unlabored respirations  HEART: Regular rate and rhythm; No murmurs, rubs, or gallops  ABDOMEN: BSx4; Soft, distended diffusely tender.   EXTREMITIES:  2+ Peripheral Pulses, brisk capillary refill. No clubbing, cyanosis, or edema  NERVOUS SYSTEM:  A&Ox3, no focal deficits , LUE tremor noted.   SKIN: No rashes or lesions

## 2024-03-12 NOTE — H&P ADULT - PROBLEM SELECTOR PLAN 3
abdominal pain with distension and constipation   Bowel regimen  Enema.   monitor for BM and passing gas. serial abdominal exams

## 2024-03-12 NOTE — ED ADULT TRIAGE NOTE - CHIEF COMPLAINT QUOTE
BIBA from home for multiple falls, as per daughter patient has been complaining of back pain x 1 week, uses cane for assistance, fell twice middle of the night today, 130am and 430am, patient denies hitting head, fs 436 at home, emt 505, patient appears tachypneic, pale, complaining of lower back pain.

## 2024-03-12 NOTE — ED ADULT NURSE NOTE - CHPI ED NUR SYMPTOMS NEG
no chest pain/no chills/no congestion/no dizziness/no fever/no nausea/no shortness of breath/no syncope/no vomiting

## 2024-03-12 NOTE — ED ADULT NURSE NOTE - OBJECTIVE STATEMENT
58 yr old male AOx3. C/o multiple falls last night and lower back pain x1 week. Back pain 10/10. Reports making it difficult to walk. Ambulates with a cane normally. Denies head trauma or LOC. Pt appears pale and diaphoretic. Tachycardic, placed on cardiac monitor. Irregular heart beat. PMH of DM. Pt denies CP, SOB, N/V/D, fever, h/a, dizziness. Daughter present at bedside.

## 2024-03-12 NOTE — ED PROVIDER NOTE - OBJECTIVE STATEMENT
58M w/ hx morbid obese (BMI 39) diabetes, HTN presenting with hyperglycemia. Pt 58M w/ hx morbid obese (BMI 39) diabetes, HTN presenting with hyperglycemia. Daughter reports for past 2w pt has not been acting himself, generalized weakness. Yesterday fell a couple times after which he had difficulty moving RLE. Poor appetite. +abd pain diffusely, none currently No fever, cp, sob, n/v. Hx alcohol used but stopped 2yr ago.

## 2024-03-13 NOTE — CHART NOTE - NSCHARTNOTEFT_GEN_A_CORE
critical value called for +bcx   -patient currently on vanco/zosyn  -TTE is ordered  -repeat Bcx ordered for the AM  -tylenol for fever  -new afib, txf to telemetry  -repeat lactate      Culture - Blood (03.12.24 @ 12:15)    Gram Stain:   Growth in anaerobic bottle: Gram positive cocci in pairs  Growth in aerobic bottle: Gram Positive Cocci in Clusters    Specimen Source: .Blood Blood-Peripheral    Culture Results:   Growth in anaerobic bottle: Gram positive cocci in pairs  Growth in aerobic bottle: Gram Positive Cocci in Clusters    Culture - Blood (03.12.24 @ 12:15)    -  Methicillin SENSITIVE Staphylococcus aureus (MSSA): Detec Any isolate of Staphylococcus aureus from a blood culture is NOT considered a contaminant.    Gram Stain:   Growth in anaerobic bottle: Gram positive cocci in pairs  Growth in aerobic bottle: Gram positive cocci in pairs    Specimen Source: .Blood Blood-Peripheral    Organism: Blood Culture PCR    Culture Results:   Growth in anaerobic bottle: Gram positive cocci in pairs  Growth in aerobic bottle: Gram positive cocci in pairs  Direct identification is available within approximately 3-5  hours either by Blood Panel Multiplexed PCR or Direct  MALDI-TOF. Details: [email or web address]    Organism Identification: Blood Culture PCR    Method Type: PCR critical value called for +bcx   -patient currently on vanco/zosyn  -TTE ord  -repeat Bcx ordered for the AM  -repeat lactate  -tylenol for fever  -new afib in ED s/p cardizem, transfer to telemetry    Culture - Blood (03.12.24 @ 12:15)    Gram Stain:   Growth in anaerobic bottle: Gram positive cocci in pairs  Growth in aerobic bottle: Gram Positive Cocci in Clusters    Specimen Source: .Blood Blood-Peripheral    Culture Results:   Growth in anaerobic bottle: Gram positive cocci in pairs  Growth in aerobic bottle: Gram Positive Cocci in Clusters    Culture - Blood (03.12.24 @ 12:15)    -  Methicillin SENSITIVE Staphylococcus aureus (MSSA): Detec Any isolate of Staphylococcus aureus from a blood culture is NOT considered a contaminant.    Gram Stain:   Growth in anaerobic bottle: Gram positive cocci in pairs  Growth in aerobic bottle: Gram positive cocci in pairs    Specimen Source: .Blood Blood-Peripheral    Organism: Blood Culture PCR    Culture Results:   Growth in anaerobic bottle: Gram positive cocci in pairs  Growth in aerobic bottle: Gram positive cocci in pairs  Direct identification is available within approximately 3-5  hours either by Blood Panel Multiplexed PCR or Direct  MALDI-TOF. Details: [email or web address]    Organism Identification: Blood Culture PCR    Method Type: PCR

## 2024-03-13 NOTE — CONSULT NOTE ADULT - SUBJECTIVE AND OBJECTIVE BOX
Patient is a 58y old  Male who presents with a chief complaint of sepsis, new Afib (13 Mar 2024 15:48)      Reason For Consult:  diabetes uncontrolled , HbA1C 9.3     HPI:  58M w/ hx morbid obese (BMI 39) diabetes, HTN presenting with hyperglycemia. Daughter reports for past 2w pt has not been acting himself, generalized weakness. Yesterday fell a couple times after which he had difficulty moving RLE. Poor appetite. +abd pain diffusely, none currently No fever, cp, sob, n/v. Hx alcohol used but stopped 2yr ago.  on .  26 units of Lantus and 8 units of prandial lispro with fingersticks now coming to <200.  Decreased glucose toxicity but patient's HbA1c was 9.3.  Infarct has infection driven-overall above his glucose toxicity    Pt endorses pain in his lower back and his shoulder which isn't new but worse than normal. Pt endorses passing gas but is constipated last BM was 3 days ago. Says he was taking oxycodone and that might be the cause. Does endorse a generalized abdominal pain due to distension. In ED pt originally with afib. s/p cardizem now sinus.  (12 Mar 2024 22:11)      PAST MEDICAL & SURGICAL HISTORY:  DM (diabetes mellitus)      Morbidly obese      Other and unspecified ventral hernia with obstruction, without gangrene      Morbidly obese      H/O ventral hernia repair  2006          FAMILY HISTORY:  Family history of diabetes mellitus (DM) (Father, Mother)    FH: CAD (coronary artery disease) (Mother)          Social History:    MEDICATIONS  (STANDING):  atorvastatin 40 milliGRAM(s) Oral at bedtime  ceFAZolin   IVPB      ceFAZolin   IVPB 2000 milliGRAM(s) IV Intermittent every 8 hours  dextrose 5%. 1000 milliLiter(s) (50 mL/Hr) IV Continuous <Continuous>  dextrose 5%. 1000 milliLiter(s) (100 mL/Hr) IV Continuous <Continuous>  dextrose 50% Injectable 25 Gram(s) IV Push once  dextrose 50% Injectable 12.5 Gram(s) IV Push once  dextrose 50% Injectable 25 Gram(s) IV Push once  diltiazem    Tablet 60 milliGRAM(s) Oral every 8 hours  enoxaparin Injectable 110 milliGRAM(s) SubCutaneous every 12 hours  gabapentin 100 milliGRAM(s) Oral every 8 hours  glucagon  Injectable 1 milliGRAM(s) IntraMuscular once  insulin glargine Injectable (LANTUS) 26 Unit(s) SubCutaneous at bedtime  insulin lispro (ADMELOG) corrective regimen sliding scale   SubCutaneous three times a day before meals  insulin lispro (ADMELOG) corrective regimen sliding scale   SubCutaneous at bedtime  insulin lispro Injectable (ADMELOG) 8 Unit(s) SubCutaneous three times a day before meals  lactulose Syrup 30 Gram(s) Oral every 6 hours  lidocaine   4% Patch 1 Patch Transdermal daily  lisinopril 2.5 milliGRAM(s) Oral daily  naloxone Injectable 0.4 milliGRAM(s) IV Push once    MEDICATIONS  (PRN):  acetaminophen     Tablet .. 975 milliGRAM(s) Oral every 6 hours PRN Temp greater or equal to 38C (100.4F), Mild Pain (1 - 3)  aluminum hydroxide/magnesium hydroxide/simethicone Suspension 30 milliLiter(s) Oral every 4 hours PRN Dyspepsia  bisacodyl 5 milliGRAM(s) Oral daily PRN Constipation  cyclobenzaprine 5 milliGRAM(s) Oral every 8 hours PRN Spasm  dextrose Oral Gel 15 Gram(s) Oral once PRN Blood Glucose LESS THAN 70 milliGRAM(s)/deciliter  guaiFENesin Oral Liquid (Sugar-Free) 200 milliGRAM(s) Oral every 6 hours PRN Cough  melatonin 3 milliGRAM(s) Oral at bedtime PRN Insomnia  ondansetron Injectable 4 milliGRAM(s) IV Push every 8 hours PRN Nausea and/or Vomiting      REVIEW OF SYSTEMS:  CONSTITUTIONAL:  as per HPI  HEENT:  Eyes:  No diplopia or blurred vision.   ENT:  No earache, sore throat or runny nose.  CARDIOVASCULAR:  No chest pain .  RESPIRATORY:  No cough, shortness of breath, PND or orthopnea.  GASTROINTESTINAL:  No nausea, vomiting or diarrhea.  GENITOURINARY:  No dysuria, frequency or urgency. No Blood in urine  MUSCULOSKELETAL:  no joint aches, no muscle pain, myalgia  SKIN:  No change in skin, hair or nails.  NEUROLOGIC:  No paresthesias, fasciculations, seizures or weakness.  PSYCHIATRIC:  No disorder of thought or mood.  ENDOCRINE:  No heat or cold intolerance, polyuria or polydipsia. abnormal weight gain or loss, oral thrush  HEMATOLOGICAL:  No easy bruising or bleeding.     T(C): 38.1 (03-13-24 @ 17:26), Max: 39.1 (03-13-24 @ 09:05)  HR: 110 (03-13-24 @ 19:46) (90 - 118)  BP: 158/84 (03-13-24 @ 19:46) (158/84 - 178/76)  RR: 20 (03-13-24 @ 19:46) (20 - 24)  SpO2: 96% (03-13-24 @ 19:46) (94% - 98%)  Wt(kg): --    PHYSICAL EXAM:  GENERAL: NAD, well-groomed, well-developed  HEAD:  Atraumatic, Normocephalic  EYES: PERRLA, conjunctiva and sclera clear  ENMT: No  exudates,, Moist mucous membranes,, No lesions  NECK: Supple, No JVD,   NERVOUS SYSTEM:  Alert & Oriented   CHEST/LUNG: Clear to percussion bilaterally; No rales, rhonchi, wheezing, or rubs  HEART: Regular rate and rhythm; No murmurs, rubs, or gallops  ABDOMEN: Soft, Nontender, Nondistended; Bowel sounds present  EXTREMITIES:  2+ Peripheral Pulses, No clubbing, cyanosis, or edema  LYMPH: No lymphadenopathy noted  SKIN: No rashes or lesions    CAPILLARY BLOOD GLUCOSE      POCT Blood Glucose.: 152 mg/dL (13 Mar 2024 21:49)  POCT Blood Glucose.: 252 mg/dL (13 Mar 2024 17:07)  POCT Blood Glucose.: 337 mg/dL (13 Mar 2024 11:50)  POCT Blood Glucose.: 394 mg/dL (13 Mar 2024 08:32)                            14.4   18.13 )-----------( 265      ( 13 Mar 2024 07:09 )             41.0       CMP:  03-13 @ 07:09  SGPT 60  Albumin 2.4   Alk Phos 136   Anion Gap 11   SGOT 35   Total Bili 0.5   BUN 37   Calcium Total 9.0   CO2 20   Chloride 101   Creatinine 0.92   eGFR if AA --   eGFR if non AA --   Glucose 381   Potassium 3.8   Protein 7.4   Sodium 132      Thyroid Function Tests:      Diabetes Tests:     Parathyroids:     Adrenals:       Radiology:

## 2024-03-13 NOTE — CONSULT NOTE ADULT - PROBLEM SELECTOR RECOMMENDATION 9
Continue with the current  regimen while inpatient   Patient can resume  home regimen upon discharge   antibiotics to continue   Thank You for the courtesy of this consultation !!!

## 2024-03-13 NOTE — CONSULT NOTE ADULT - ASSESSMENT
58M HTN, DM who presented with malaise, found to be septic, hyperglycemic with +RVP for coronavirus and MSSA bacteremia of undetermined source.    Pt with episode of pAF with RVR in the ED that converted to SR with IV diltiazem and treatment of his fever.    -cont bblocker, monitor for hypotension  -in the acute setting recommend AC for stroke prevention - hep gtt for now as the pt may require intervention          technical difficulties - note signed late

## 2024-03-13 NOTE — PROGRESS NOTE ADULT - PROBLEM SELECTOR PLAN 2
new afib on presentation   check TSH free t4   check lipid profile  obtain ECHO   obtain cardiology consult , the case d/w team   continue -AC   incerase metoprolol from 25 bid to 50 bid

## 2024-03-13 NOTE — PROGRESS NOTE ADULT - TIME BILLING
Over 37 minutes aggregate care time spent on encounter; activities included   direct patient care, counseling and/or coordinating care reviewing notes, lab data/ imaging , discussion with multidisciplinary team/ patient  /family.

## 2024-03-13 NOTE — PROGRESS NOTE ADULT - PROBLEM SELECTOR PLAN 3
abdominal pain with distension and constipation   Bowel regimen  Enema.   monitor for BM and passing gas. serial abdominal exams abdominal pain with distension and constipation   Bowel regimen  Enema.   monitor for BM and passing gas. serial abdominal exams  3/13/24 s/p manual disimpaction by myself  multiple very hard balls was evacuated

## 2024-03-13 NOTE — PROGRESS NOTE ADULT - SUBJECTIVE AND OBJECTIVE BOX
Patient is a 58y old  Male who presents with a chief complaint of sepsis, new Afib (13 Mar 2024 11:19)      OVERNIGHT EVENTS:    admitted overnight with afib and sepsis-    MEDICATIONS  (STANDING):  atorvastatin 40 milliGRAM(s) Oral at bedtime  ceFAZolin   IVPB 2000 milliGRAM(s) IV Intermittent once  ceFAZolin   IVPB 2000 milliGRAM(s) IV Intermittent every 8 hours  ceFAZolin   IVPB      dextrose 5%. 1000 milliLiter(s) (100 mL/Hr) IV Continuous <Continuous>  dextrose 5%. 1000 milliLiter(s) (50 mL/Hr) IV Continuous <Continuous>  dextrose 50% Injectable 25 Gram(s) IV Push once  dextrose 50% Injectable 25 Gram(s) IV Push once  dextrose 50% Injectable 12.5 Gram(s) IV Push once  enoxaparin Injectable 110 milliGRAM(s) SubCutaneous every 12 hours  gabapentin 100 milliGRAM(s) Oral every 8 hours  glucagon  Injectable 1 milliGRAM(s) IntraMuscular once  insulin glargine Injectable (LANTUS) 15 Unit(s) SubCutaneous at bedtime  insulin lispro (ADMELOG) corrective regimen sliding scale   SubCutaneous three times a day before meals  insulin lispro (ADMELOG) corrective regimen sliding scale   SubCutaneous at bedtime  insulin lispro Injectable (ADMELOG) 5 Unit(s) SubCutaneous three times a day before meals  lidocaine   4% Patch 1 Patch Transdermal daily  lisinopril 2.5 milliGRAM(s) Oral daily  metoprolol tartrate 25 milliGRAM(s) Oral two times a day  naloxone Injectable 0.4 milliGRAM(s) IV Push once  polyethylene glycol 3350 17 Gram(s) Oral daily  senna 2 Tablet(s) Oral at bedtime    MEDICATIONS  (PRN):  acetaminophen     Tablet .. 650 milliGRAM(s) Oral every 6 hours PRN Temp greater or equal to 38C (100.4F), Mild Pain (1 - 3)  aluminum hydroxide/magnesium hydroxide/simethicone Suspension 30 milliLiter(s) Oral every 4 hours PRN Dyspepsia  bisacodyl 5 milliGRAM(s) Oral daily PRN Constipation  cyclobenzaprine 5 milliGRAM(s) Oral every 8 hours PRN Spasm  dextrose Oral Gel 15 Gram(s) Oral once PRN Blood Glucose LESS THAN 70 milliGRAM(s)/deciliter  melatonin 3 milliGRAM(s) Oral at bedtime PRN Insomnia  ondansetron Injectable 4 milliGRAM(s) IV Push every 8 hours PRN Nausea and/or Vomiting  oxyCODONE    IR 2.5 milliGRAM(s) Oral every 4 hours PRN Moderate Pain (4 - 6)  oxyCODONE    IR 5 milliGRAM(s) Oral every 4 hours PRN Severe Pain (7 - 10)      Allergies    No Known Allergies    Intolerances        SUBJECTIVE: in bed in NAD, no acute events overnight     T(F): 100.2 (03-13-24 @ 10:20), Max: 102.3 (03-13-24 @ 09:05)  HR: 112 (03-13-24 @ 10:20) (107 - 165)  BP: 168/95 (03-13-24 @ 10:20) (146/88 - 178/76)  RR: 21 (03-13-24 @ 10:20) (20 - 29)  SpO2: 94% (03-13-24 @ 10:20) (94% - 99%)  Wt(kg): --    PHYSICAL EXAM:  GENERAL: NAD, well-groomed, well-developed  HEAD:  Atraumatic, Normocephalic  EYES: EOMI, PERRLA, conjunctiva and sclera clear  ENMT: No tonsillar erythema, exudates, or enlargement; Moist mucous membranes, Good dentition, No lesions  NECK: Supple,   CHEST/LUNG: Clear to  auscultation bilaterally; No rales, rhonchi, wheezing, or rubs  bilaterally  HEART: Regular rate and rhythm; No murmurs, rubs, or gallops  ABDOMEN: Soft, Nontender, Nondistended; Bowel sounds present  EXTREMITIES:  2+ Peripheral Pulses, No clubbing, cyanosis, or edema BL LE    SKIN: No rashes or lesions  NERVOUS SYSTEM:  Alert & Oriented X3, Good concentration; Motor Strength 5/5 B/L upper and lower extremities;   DTRs 2+ intact and symmetric, sensation intact BL    LABS:                        14.4   18.13 )-----------( 265      ( 13 Mar 2024 07:09 )             41.0     03-13    132<L>  |  101  |  37<H>  ----------------------------<  381<H>  3.8   |  20<L>  |  0.92    Ca    9.0      13 Mar 2024 07:09    TPro  7.4  /  Alb  2.4<L>  /  TBili  0.5  /  DBili  x   /  AST  35  /  ALT  60  /  AlkPhos  136<H>  03-13      PT/INR - ( 12 Mar 2024 12:15 )   PT: 13.7 sec;   INR: 1.16 ratio         PTT - ( 12 Mar 2024 12:15 )  PTT:27.3 sec  Urinalysis Basic - ( 13 Mar 2024 07:09 )    Color: x / Appearance: x / SG: x / pH: x  Gluc: 381 mg/dL / Ketone: x  / Bili: x / Urobili: x   Blood: x / Protein: x / Nitrite: x   Leuk Esterase: x / RBC: x / WBC x   Sq Epi: x / Non Sq Epi: x / Bacteria: x      Cultures;   CAPILLARY BLOOD GLUCOSE      POCT Blood Glucose.: 394 mg/dL (13 Mar 2024 08:32)  POCT Blood Glucose.: 361 mg/dL (12 Mar 2024 22:29)  POCT Blood Glucose.: 412 mg/dL (12 Mar 2024 20:02)    Lipid panel:           RADIOLOGY & ADDITIONAL TESTS:    < from: CT Angio Neck w/ IV Cont (03.12.24 @ 17:24) >  IMPRESSION:    CTA brain:  No flow-limiting stenosis or vascular aneurysm. No AVM.  Venous system is well opacified, no evidence for venous sinus or cortical   vein thrombosis.    CTA neck:  No flow-limiting stenosis, evidence for arterial dissection, or vascular   aneurysm.    < end of copied text >    Imaging Personally Reviewed:  [ x] YES      Consultant(s) Notes Reviewed:  [x ] YES   < from: CT Chest No Cont (03.12.24 @ 17:19) >  IMPRESSION:    Mild bibasilar atelectasis. The lungs are otherwise clear.      < end of copied text >  < from: CT Abdomen and Pelvis w/ IV Cont (03.12.24 @ 14:58) >    IMPRESSION:  Small bilateral pleural effusions.    Constipated colon, most pronounced in the cecum.    Gastric air-fluid level. Correlate clinically for gastroparesis or   partial gastric outlet obstruction. Single prominent perigastric node   measuring 8 mm.    Hepatic steatosis.        --- End of Report ---    < end of copied text >    < from: CT Lumbar Spine No Cont (03.12.24 @ 14:52) >    IMPRESSION:    No fracture. Grade 1 retrolisthesis of L2-L5.  Lower lumbar disc bulging.    < end of copied text >    Care Discussed with [x ] Consultants [X ] Patient [x ] Family  [x ]    [x ]  Other; RN Patient is a 58y old  Male who presents with a chief complaint of sepsis, new Afib (13 Mar 2024 11:19)      OVERNIGHT EVENTS:    admitted overnight with afib and sepsis-    MEDICATIONS  (STANDING):  atorvastatin 40 milliGRAM(s) Oral at bedtime  ceFAZolin   IVPB 2000 milliGRAM(s) IV Intermittent once  ceFAZolin   IVPB 2000 milliGRAM(s) IV Intermittent every 8 hours  ceFAZolin   IVPB      dextrose 5%. 1000 milliLiter(s) (100 mL/Hr) IV Continuous <Continuous>  dextrose 5%. 1000 milliLiter(s) (50 mL/Hr) IV Continuous <Continuous>  dextrose 50% Injectable 25 Gram(s) IV Push once  dextrose 50% Injectable 25 Gram(s) IV Push once  dextrose 50% Injectable 12.5 Gram(s) IV Push once  enoxaparin Injectable 110 milliGRAM(s) SubCutaneous every 12 hours  gabapentin 100 milliGRAM(s) Oral every 8 hours  glucagon  Injectable 1 milliGRAM(s) IntraMuscular once  insulin glargine Injectable (LANTUS) 15 Unit(s) SubCutaneous at bedtime  insulin lispro (ADMELOG) corrective regimen sliding scale   SubCutaneous three times a day before meals  insulin lispro (ADMELOG) corrective regimen sliding scale   SubCutaneous at bedtime  insulin lispro Injectable (ADMELOG) 5 Unit(s) SubCutaneous three times a day before meals  lidocaine   4% Patch 1 Patch Transdermal daily  lisinopril 2.5 milliGRAM(s) Oral daily  metoprolol tartrate 25 milliGRAM(s) Oral two times a day  naloxone Injectable 0.4 milliGRAM(s) IV Push once  polyethylene glycol 3350 17 Gram(s) Oral daily  senna 2 Tablet(s) Oral at bedtime    MEDICATIONS  (PRN):  acetaminophen     Tablet .. 650 milliGRAM(s) Oral every 6 hours PRN Temp greater or equal to 38C (100.4F), Mild Pain (1 - 3)  aluminum hydroxide/magnesium hydroxide/simethicone Suspension 30 milliLiter(s) Oral every 4 hours PRN Dyspepsia  bisacodyl 5 milliGRAM(s) Oral daily PRN Constipation  cyclobenzaprine 5 milliGRAM(s) Oral every 8 hours PRN Spasm  dextrose Oral Gel 15 Gram(s) Oral once PRN Blood Glucose LESS THAN 70 milliGRAM(s)/deciliter  melatonin 3 milliGRAM(s) Oral at bedtime PRN Insomnia  ondansetron Injectable 4 milliGRAM(s) IV Push every 8 hours PRN Nausea and/or Vomiting  oxyCODONE    IR 2.5 milliGRAM(s) Oral every 4 hours PRN Moderate Pain (4 - 6)  oxyCODONE    IR 5 milliGRAM(s) Oral every 4 hours PRN Severe Pain (7 - 10)      Allergies    No Known Allergies    Intolerances        SUBJECTIVE: in bed in NAD, no acute events overnight     T(F): 100.2 (03-13-24 @ 10:20), Max: 102.3 (03-13-24 @ 09:05)  HR: 112 (03-13-24 @ 10:20) (107 - 165)  BP: 168/95 (03-13-24 @ 10:20) (146/88 - 178/76)  RR: 21 (03-13-24 @ 10:20) (20 - 29)  SpO2: 94% (03-13-24 @ 10:20) (94% - 99%)  Wt(kg): --    PHYSICAL EXAM:  GENERAL: NAD, well-groomed, well-developed  HEAD:  Atraumatic, Normocephalic  EYES: EOMI, PERRLA, conjunctiva and sclera clear  ENMT: No tonsillar erythema, exudates, or enlargement; Moist mucous membranes, Good dentition, No lesions  NECK: Supple,   CHEST/LUNG: Clear to  auscultation bilaterally; No rales, rhonchi, wheezing, or rubs  bilaterally  HEART: Regular rate and rhythm; No murmurs, rubs, or gallops  ABDOMEN: Soft, Nontender, distended; Bowel sounds present  EXTREMITIES:  2+ Peripheral Pulses, No clubbing, cyanosis, or edema BL LE    SKIN: No rashes or lesions  NERVOUS SYSTEM:  sleepy  and arousal with painful stimuli ; able to answer simple questions   LABS:                        14.4   18.13 )-----------( 265      ( 13 Mar 2024 07:09 )             41.0     03-13    132<L>  |  101  |  37<H>  ----------------------------<  381<H>  3.8   |  20<L>  |  0.92    Ca    9.0      13 Mar 2024 07:09    TPro  7.4  /  Alb  2.4<L>  /  TBili  0.5  /  DBili  x   /  AST  35  /  ALT  60  /  AlkPhos  136<H>  03-13      PT/INR - ( 12 Mar 2024 12:15 )   PT: 13.7 sec;   INR: 1.16 ratio         PTT - ( 12 Mar 2024 12:15 )  PTT:27.3 sec  Urinalysis Basic - ( 13 Mar 2024 07:09 )    Color: x / Appearance: x / SG: x / pH: x  Gluc: 381 mg/dL / Ketone: x  / Bili: x / Urobili: x   Blood: x / Protein: x / Nitrite: x   Leuk Esterase: x / RBC: x / WBC x   Sq Epi: x / Non Sq Epi: x / Bacteria: x      Cultures;   CAPILLARY BLOOD GLUCOSE      POCT Blood Glucose.: 394 mg/dL (13 Mar 2024 08:32)  POCT Blood Glucose.: 361 mg/dL (12 Mar 2024 22:29)  POCT Blood Glucose.: 412 mg/dL (12 Mar 2024 20:02)    Lipid panel:           RADIOLOGY & ADDITIONAL TESTS:    < from: CT Angio Neck w/ IV Cont (03.12.24 @ 17:24) >  IMPRESSION:    CTA brain:  No flow-limiting stenosis or vascular aneurysm. No AVM.  Venous system is well opacified, no evidence for venous sinus or cortical   vein thrombosis.    CTA neck:  No flow-limiting stenosis, evidence for arterial dissection, or vascular   aneurysm.    < end of copied text >    Imaging Personally Reviewed:  [ x] YES      Consultant(s) Notes Reviewed:  [x ] YES   < from: CT Chest No Cont (03.12.24 @ 17:19) >  IMPRESSION:    Mild bibasilar atelectasis. The lungs are otherwise clear.      < end of copied text >  < from: CT Abdomen and Pelvis w/ IV Cont (03.12.24 @ 14:58) >    IMPRESSION:  Small bilateral pleural effusions.    Constipated colon, most pronounced in the cecum.    Gastric air-fluid level. Correlate clinically for gastroparesis or   partial gastric outlet obstruction. Single prominent perigastric node   measuring 8 mm.    Hepatic steatosis.        --- End of Report ---    < end of copied text >    < from: CT Lumbar Spine No Cont (03.12.24 @ 14:52) >    IMPRESSION:    No fracture. Grade 1 retrolisthesis of L2-L5.  Lower lumbar disc bulging.    < end of copied text >    Care Discussed with [x ] Consultants [X ] Patient [x ] Family  [x ]    [x ]  Other; RN

## 2024-03-13 NOTE — CONSULT NOTE ADULT - ASSESSMENT
Probable toxic metabolic vs. hepatic encephalopathy  Possible acute stroke  Sepsis  New onset A-fib  Back pain  DM2  HTN    - on full dose Lovenox and statin for possible stroke prevention  - MRI brain wo [place] to rule out stroke  - EEG to rule out seizures  - check ammonia and TSH  - Echo pending     Thank you for the consult.

## 2024-03-13 NOTE — PROGRESS NOTE ADULT - ASSESSMENT
58M w/ hx morbid obese (BMI 39) DM, HTN presenting with hyperglycemia. Daughter reports for past 2w pt has not been acting himself, generalized weakness. with multiples falls. Pt septic in ED no source found. Abdomen distended with constipation passing gas.

## 2024-03-13 NOTE — CONSULT NOTE ADULT - ASSESSMENT
A/P-  58 year old Male with PMH of obesity, diabetes, HTN presents with weakness  and found to have :    lactic acidosis  MSSA bacteremia  distended abdomen  fever with t-max 102.4  + leukocytois  RVP- positive for other coronavirus 229E  covid-19 Negative  lethargic    Ct of brain - reported negative for any acute findings  Ct of lumbar spine- reported negative    Ct Abd- reported as:  Constipated colon, most pronounced in the cecum.  Gastric air-fluid level. Correlate clinically for gastroparesis or   partial gastric outlet obstruction. Single prominent perigastric node   measuring 8 mm.        Plan-  d/c vanco and zosyn  start Cefazolin 2 gram Q8 hours for MSSA bacteremia.  check TTE r/o IE.  check 2 more blood cx.  check UA and urine cx.  advise to obtain surgical eval for the distended abdomen.  monitor lactate level and wbc closely.  also advise neuro eval for the UE muscular twitches and lethargy further eval.      All labs and imaging and chart notes reviewed.     All above discussed with patient and patient verbalizes full understanding of all above and agrees with above plan of care.    Thank you for this consultation.    d/w Hospitalist .    Miguel Patel MD  Infectious Disease Attending    for any questions please do not hesitate to contact me either via teams or by calling [phone]

## 2024-03-13 NOTE — CONSULT NOTE ADULT - SUBJECTIVE AND OBJECTIVE BOX
Cardiology Initial Consult    [facility] - Cardiology at [place] [address], [place] [address]  Office: [phone]  Fax: [phone]    CHIEF COMPLAINT:      HISTORY OF PRESENT ILLNESS:  58M HTN, DM who presented with malaise, found to be septic, hyperglycemic with +RVP for coronavirus and MSSA bacteremia of undetermined source.    Pt with episode of pAF with RVR in the ED that converted to SR with IV diltiazem and treatment of his fever.      Allergies  No Known Allergies  Intolerances    	    MEDICATIONS:  enoxaparin Injectable 110 milliGRAM(s) SubCutaneous every 12 hours  lisinopril 2.5 milliGRAM(s) Oral daily  metoprolol tartrate 50 milliGRAM(s) Oral two times a day  ceFAZolin   IVPB      ceFAZolin   IVPB 2000 milliGRAM(s) IV Intermittent every 8 hours  acetaminophen     Tablet .. 975 milliGRAM(s) Oral every 6 hours PRN  cyclobenzaprine 5 milliGRAM(s) Oral every 8 hours PRN  gabapentin 100 milliGRAM(s) Oral every 8 hours  melatonin 3 milliGRAM(s) Oral at bedtime PRN  ondansetron Injectable 4 milliGRAM(s) IV Push every 8 hours PRN  oxyCODONE    IR 2.5 milliGRAM(s) Oral every 4 hours PRN  oxyCODONE    IR 5 milliGRAM(s) Oral every 4 hours PRN  aluminum hydroxide/magnesium hydroxide/simethicone Suspension 30 milliLiter(s) Oral every 4 hours PRN  bisacodyl 5 milliGRAM(s) Oral daily PRN  lactulose Syrup 20 Gram(s) Oral daily  polyethylene glycol 3350 17 Gram(s) Oral daily  senna 2 Tablet(s) Oral at bedtime  atorvastatin 40 milliGRAM(s) Oral at bedtime  dextrose 50% Injectable 25 Gram(s) IV Push once  dextrose 50% Injectable 25 Gram(s) IV Push once  dextrose 50% Injectable 12.5 Gram(s) IV Push once  dextrose Oral Gel 15 Gram(s) Oral once PRN  glucagon  Injectable 1 milliGRAM(s) IntraMuscular once  insulin glargine Injectable (LANTUS) 26 Unit(s) SubCutaneous at bedtime  insulin lispro (ADMELOG) corrective regimen sliding scale   SubCutaneous three times a day before meals  insulin lispro (ADMELOG) corrective regimen sliding scale   SubCutaneous at bedtime  insulin lispro Injectable (ADMELOG) 8 Unit(s) SubCutaneous three times a day before meals    dextrose 5%. 1000 milliLiter(s) IV Continuous <Continuous>  dextrose 5%. 1000 milliLiter(s) IV Continuous <Continuous>  lidocaine   4% Patch 1 Patch Transdermal daily      PAST MEDICAL & SURGICAL HISTORY:  DM (diabetes mellitus)      Morbidly obese      Other and unspecified ventral hernia with obstruction, without gangrene      Morbidly obese      H/O ventral hernia repair  2006          FAMILY HISTORY:  Family history of diabetes mellitus (DM) (Father, Mother)    FH: CAD (coronary artery disease) (Mother)        SOCIAL HISTORY:    [ ] Non-smoker  [ ] Smoker  [ ] Alcohol    Review of Systems:  Constitutional: [ ] Fever [ ] Chills [ ] Fatigue [ ] Weight change   HEENT: [ ] Blurred vision [ ] Eye pain [ ] Headache [ ] Runny nose [ ] Sore throat   Respiratory: [ ] Cough [ ] Wheezing [ ] Shortness of breath  Cardiovascular: [ ] Chest Pain [ ] Palpitations [ ] ORR [ ] PND [ ] Orthopnea  Gastrointestinal: [ ] Abdominal Pain [ ] Diarrhea [ ] Constipation [ ] Hemorrhoids [ ] Nausea [ ] Vomiting  Genitourinary: [ ] Nocturia [ ] Dysuria [ ] Incontinence  Extremities: [ ] Swelling [ ] Joint Pain  Neurologic: [ ] Focal deficit [ ] Paresthesias [ ] Syncope  Skin: [ ] Rash [ ] Ecchymoses [ ] Wounds [ ] Lesions  Psychiatry: [ ] Depression [ ] Suicidal/Homicidal ideation [ ] Anxiety [ ] Sleep disturbances  [ ] 10 point review of systems is otherwise negative except as mentioned above            [ ]Unable to obtain    PHYSICAL EXAM:  T(C): 37.9 (03-13-24 @ 10:20), Max: 39.1 (03-13-24 @ 09:05)  HR: 112 (03-13-24 @ 10:20) (107 - 165)  BP: 168/95 (03-13-24 @ 10:20) (146/88 - 178/76)  RR: 21 (03-13-24 @ 10:20) (20 - 29)  SpO2: 94% (03-13-24 @ 10:20) (94% - 99%)  Wt(kg): --  I&O's Summary    12 Mar 2024 07:01  -  13 Mar 2024 07:00  --------------------------------------------------------  IN: 0 mL / OUT: 1250 mL / NET: -1250 mL    13 Mar 2024 07:01  -  13 Mar 2024 13:16  --------------------------------------------------------  IN: 0 mL / OUT: 575 mL / NET: -575 mL        Appearance: No acute distress  HEENT:   Normal oral mucosa, PERRL  Cardiovascular: Normal S1 S2, no elevated JVP, no murmurs, no edema  Respiratory: Lungs clear to auscultation	, good air movement  Psychiatry: A & O x 3, Mood & affect appropriate  Gastrointestinal:  soft nt nd normoactive bs	  Skin: No rashes, no ecchymoses, no cyanosis	  Neurologic: grossly non-focal  Extremities: Normal range of motion, no clubbing, cyanosis or edema  Vascular: Peripheral pulses palpable bilaterally    LABS:	 	  CBC Full  -  ( 13 Mar 2024 07:09 )  WBC Count : 18.13 K/uL  Hemoglobin : 14.4 g/dL  Hematocrit : 41.0 %  Platelet Count - Automated : 265 K/uL  Mean Cell Volume : 82.5 fl  Mean Cell Hemoglobin : 29.0 pg  Mean Cell Hemoglobin Concentration : 35.1 g/dL  Auto Neutrophil # : 14.90 K/uL  Auto Lymphocyte # : 1.08 K/uL  Auto Monocyte # : 1.54 K/uL  Auto Eosinophil # : 0.00 K/uL  Auto Basophil # : 0.13 K/uL  Auto Neutrophil % : 82.2 %  Auto Lymphocyte % : 6.0 %  Auto Monocyte % : 8.5 %  Auto Eosinophil % : 0.0 %  Auto Basophil % : 0.7 %    03-13    132<L>  |  101  |  37<H>  ----------------------------<  381<H>  3.8   |  20<L>  |  0.92  03-12    132<L>  |  99  |  40<H>  ----------------------------<  430<H>  3.9   |  16<L>  |  1.11    Ca    9.0      13 Mar 2024 07:09  Ca    9.4      12 Mar 2024 15:27    TPro  7.4  /  Alb  2.4<L>  /  TBili  0.5  /  DBili  x   /  AST  35  /  ALT  60  /  AlkPhos  136<H>  03-13  TPro  8.7<H>  /  Alb  3.0<L>  /  TBili  0.6  /  DBili  x   /  AST  23  /  ALT  46  /  AlkPhos  139<H>  03-12      proBNP:   Lipid Profile:   HgA1c:   TSH:     CARDIAC MARKERS:                TELEMETRY: 	    ECG:  	  RADIOLOGY:  OTHER: 	    PREVIOUS DIAGNOSTIC TESTING:    [ ] Echocardiogram:   [ ] Catheterization:  [ ] Stress Test:  	 Cardiology Initial Consult    [facility] - Cardiology at [place] [address], [place] [address]  Office: [phone]  Fax: [phone]    CHIEF COMPLAINT:  malaise    HISTORY OF PRESENT ILLNESS:  58M HTN, DM who presented with malaise, found to be septic, hyperglycemic with +RVP for coronavirus and MSSA bacteremia of undetermined source.    Pt with episode of pAF with RVR in the ED that converted to SR with IV diltiazem and treatment of his fever.    Currently in       Allergies  No Known Allergies  Intolerances    	    MEDICATIONS:  enoxaparin Injectable 110 milliGRAM(s) SubCutaneous every 12 hours  lisinopril 2.5 milliGRAM(s) Oral daily  metoprolol tartrate 50 milliGRAM(s) Oral two times a day  ceFAZolin   IVPB      ceFAZolin   IVPB 2000 milliGRAM(s) IV Intermittent every 8 hours  acetaminophen     Tablet .. 975 milliGRAM(s) Oral every 6 hours PRN  cyclobenzaprine 5 milliGRAM(s) Oral every 8 hours PRN  gabapentin 100 milliGRAM(s) Oral every 8 hours  melatonin 3 milliGRAM(s) Oral at bedtime PRN  ondansetron Injectable 4 milliGRAM(s) IV Push every 8 hours PRN  oxyCODONE    IR 2.5 milliGRAM(s) Oral every 4 hours PRN  oxyCODONE    IR 5 milliGRAM(s) Oral every 4 hours PRN  aluminum hydroxide/magnesium hydroxide/simethicone Suspension 30 milliLiter(s) Oral every 4 hours PRN  bisacodyl 5 milliGRAM(s) Oral daily PRN  lactulose Syrup 20 Gram(s) Oral daily  polyethylene glycol 3350 17 Gram(s) Oral daily  senna 2 Tablet(s) Oral at bedtime  atorvastatin 40 milliGRAM(s) Oral at bedtime  dextrose 50% Injectable 25 Gram(s) IV Push once  dextrose 50% Injectable 25 Gram(s) IV Push once  dextrose 50% Injectable 12.5 Gram(s) IV Push once  dextrose Oral Gel 15 Gram(s) Oral once PRN  glucagon  Injectable 1 milliGRAM(s) IntraMuscular once  insulin glargine Injectable (LANTUS) 26 Unit(s) SubCutaneous at bedtime  insulin lispro (ADMELOG) corrective regimen sliding scale   SubCutaneous three times a day before meals  insulin lispro (ADMELOG) corrective regimen sliding scale   SubCutaneous at bedtime  insulin lispro Injectable (ADMELOG) 8 Unit(s) SubCutaneous three times a day before meals    dextrose 5%. 1000 milliLiter(s) IV Continuous <Continuous>  dextrose 5%. 1000 milliLiter(s) IV Continuous <Continuous>  lidocaine   4% Patch 1 Patch Transdermal daily      PAST MEDICAL & SURGICAL HISTORY:  DM (diabetes mellitus)  Morbidly obese  Other and unspecified ventral hernia with obstruction, without gangrene  Morbidly obese  H/O ventral hernia repair  2006    FAMILY HISTORY:  Family history of diabetes mellitus (DM) (Father, Mother)    FH: CAD (coronary artery disease) (Mother)        SOCIAL HISTORY:    [x] Non-smoker  [ ] Smoker  [ ] Alcohol    Review of Systems:  Constitutional: [x ] Fever [x ] Chills [x ] Fatigue [ ] Weight change   HEENT: [ ] Blurred vision [ ] Eye pain [ ] Headache [ -] Runny nose [ ] Sore throat   Respiratory: [ -] Cough [ ] Wheezing [ -] Shortness of breath  Cardiovascular: [- ] Chest Pain [- ] Palpitations [ ] ORR [ ] PND [ ] Orthopnea  Gastrointestinal: [ ] Abdominal Pain [ ] Diarrhea [x ] Constipation [ ] Hemorrhoids [ ] Nausea [ ] Vomiting  Genitourinary: [ ] Nocturia [- ] Dysuria [ ] Incontinence  Extremities: [- ] Swelling [ ] Joint Pain  Neurologic: [ ] Focal deficit [ ] Paresthesias [- ] Syncope  Skin: [- ] Rash [ ] Ecchymoses [ ] Wounds [ ] Lesions  Psychiatry: [ -] Depression [ ] Suicidal/Homicidal ideation [ ] Anxiety [ ] Sleep disturbances  [ x] 10 point review of systems is otherwise negative except as mentioned above            [ ]Unable to obtain    PHYSICAL EXAM:  T(C): 37.9 (03-13-24 @ 10:20), Max: 39.1 (03-13-24 @ 09:05)  HR: 112 (03-13-24 @ 10:20) (107 - 165)  BP: 168/95 (03-13-24 @ 10:20) (146/88 - 178/76)  RR: 21 (03-13-24 @ 10:20) (20 - 29)  SpO2: 94% (03-13-24 @ 10:20) (94% - 99%)  Wt(kg): --  I&O's Summary    12 Mar 2024 07:01  -  13 Mar 2024 07:00  --------------------------------------------------------  IN: 0 mL / OUT: 1250 mL / NET: -1250 mL    13 Mar 2024 07:01  -  13 Mar 2024 13:16  --------------------------------------------------------  IN: 0 mL / OUT: 575 mL / NET: -575 mL      Appearance: ill appearing, no acute distress  HEENT:   mmm  Cardiovascular: Normal S1 S2, no elevated JVP, no murmurs, trace edema  Respiratory: Lungs clear to auscultation, poor air movement  Psychiatry: calm  Gastrointestinal:  distended, nt to light palpation  Skin: no cyanosis	  Neurologic: grossly non-focal  Vascular: Peripheral pulses palpable bilaterally    LABS:	 	  CBC Full  -  ( 13 Mar 2024 07:09 )  WBC Count : 18.13 K/uL  Hemoglobin : 14.4 g/dL  Hematocrit : 41.0 %  Platelet Count - Automated : 265 K/uL    03-13  132<L>  |  101  |  37<H>  ----------------------------<  381<H>  3.8   |  20<L>  |  0.92    03-12  132<L>  |  99  |  40<H>  ----------------------------<  430<H>  3.9   |  16<L>  |  1.11    Ca    9.0      13 Mar 2024 07:09  Ca    9.4      12 Mar 2024 15:27    TPro  7.4  /  Alb  2.4<L>  /  TBili  0.5  /  DBili  x   /  AST  35  /  ALT  60  /  AlkPhos  136<H>  03-13  TPro  8.7<H>  /  Alb  3.0<L>  /  TBili  0.6  /  DBili  x   /  AST  23  /  ALT  46  /  AlkPhos  139<H>  03-12    CARDIAC MARKERS:  TELEMETRY: 	  ST  ECG:  	Af 208, ST 100s  RADIOLOGY:  OTHER: 	    PREVIOUS DIAGNOSTIC TESTING:    [ ] Echocardiogram:   [ ] Catheterization:  [ ] Stress Test:

## 2024-03-13 NOTE — CONSULT NOTE ADULT - SUBJECTIVE AND OBJECTIVE BOX
HPI: 58 year old man with hx of HTN, DM2, and obesity presenting with confusion and weakness. Patient has not been himself since late February 2024. He fell a couple of times and has weakness of the RLE. Patient denies hx of stroke and denies having right leg weakness in the past. Patient has a history of alcohol use, which was stopped in 2022. CT head shows no acute process.     PMHx: HTN, DM2, Obesity  PSHx: ventral hernia (2006)  PFHx: DM2  Social Hx: unable to obtain due to AMS  Allergies: NKDA  ROS: unable to obtain due to AMS  Medications: see EMR    Vitals: Temp 100.2F     Tmax 102.3F       RR  21        HR  112      BP  168/95  General: NAD  CV: regular rate and rhythm  Resp: no respiratory distress  Neck: supple  Neuro Exam: Lethargic. Briefly opens eyes to voice. Oriented to self, place, and month. Quickly falls asleep. Follows simple commands. No dysarthria. No aphasia. VOR intact. No facial droop. PERRL. BTT. Tongue is midline. Palate elevate symmetrically. Shoulder shrug is intact. Finger to nose intact. Right leg weakness (distally 3/5 and proximally 0/5). Reflexes symmetric, diminished and toes down. Gait exam deferred. Sensory intact to touch. Myoclonus noted on exam.    NIHSS- 7    CT head, CTA head/neck, and labs reviewed

## 2024-03-13 NOTE — PROVIDER CONTACT NOTE (CRITICAL VALUE NOTIFICATION) - BACKGROUND
Admitted with fever, hyperglycemia
Patient comes to hospital with altered mental status, T102.3.  Report taken on patient this morning, patient had no iv, pulled out previous 2 iv's.  on iv abx.

## 2024-03-13 NOTE — PROVIDER CONTACT NOTE (CRITICAL VALUE NOTIFICATION) - ASSESSMENT
Patient's speech is incoherent.  Unable to assess orientation.
Patient awake, restless, remains afebrile

## 2024-03-13 NOTE — CONSULT NOTE ADULT - SUBJECTIVE AND OBJECTIVE BOX
HPI:  58M w/ hx morbid obese (BMI 39) diabetes, HTN presenting with hyperglycemia. Daughter reports for past 2w pt has not been acting himself, generalized weakness. Yesterday fell a couple times after which he had difficulty moving RLE. Poor appetite. +abd pain diffusely, none currently No fever, cp, sob, n/v. Hx alcohol used but stopped 2yr ago.      Pt endorses pain in his lower back and his shoulder which isn't new but worse than normal. Pt endorses passing gas but is constipated last BM was 3 days ago. Says he was taking oxycodone and that might be the cause. Does endorse a generalized abdominal pain due to distension. In ED pt originally with afib. s/p cardizem now sinus.  (12 Mar 2024 22:11)      PAST MEDICAL & SURGICAL HISTORY:  DM (diabetes mellitus)      Morbidly obese      Other and unspecified ventral hernia with obstruction, without gangrene      Morbidly obese      H/O ventral hernia repair  2006          Allergies    No Known Allergies    Intolerances        ANTIMICROBIALS:  piperacillin/tazobactam IVPB.. 3.375 every 8 hours  vancomycin  IVPB 1250 every 12 hours      OTHER MEDS:  acetaminophen     Tablet .. 650 milliGRAM(s) Oral every 6 hours PRN  aluminum hydroxide/magnesium hydroxide/simethicone Suspension 30 milliLiter(s) Oral every 4 hours PRN  atorvastatin 40 milliGRAM(s) Oral at bedtime  bisacodyl 5 milliGRAM(s) Oral daily PRN  cyclobenzaprine 5 milliGRAM(s) Oral every 8 hours PRN  dextrose 5%. 1000 milliLiter(s) IV Continuous <Continuous>  dextrose 5%. 1000 milliLiter(s) IV Continuous <Continuous>  dextrose 50% Injectable 25 Gram(s) IV Push once  dextrose 50% Injectable 12.5 Gram(s) IV Push once  dextrose 50% Injectable 25 Gram(s) IV Push once  dextrose Oral Gel 15 Gram(s) Oral once PRN  enoxaparin Injectable 110 milliGRAM(s) SubCutaneous every 12 hours  gabapentin 100 milliGRAM(s) Oral every 8 hours  glucagon  Injectable 1 milliGRAM(s) IntraMuscular once  insulin glargine Injectable (LANTUS) 15 Unit(s) SubCutaneous at bedtime  insulin lispro (ADMELOG) corrective regimen sliding scale   SubCutaneous three times a day before meals  insulin lispro (ADMELOG) corrective regimen sliding scale   SubCutaneous at bedtime  insulin lispro Injectable (ADMELOG) 5 Unit(s) SubCutaneous three times a day before meals  lidocaine   4% Patch 1 Patch Transdermal daily  lisinopril 2.5 milliGRAM(s) Oral daily  melatonin 3 milliGRAM(s) Oral at bedtime PRN  metoprolol tartrate 25 milliGRAM(s) Oral two times a day  naloxone Injectable 0.4 milliGRAM(s) IV Push once  ondansetron Injectable 4 milliGRAM(s) IV Push every 8 hours PRN  oxyCODONE    IR 2.5 milliGRAM(s) Oral every 4 hours PRN  oxyCODONE    IR 5 milliGRAM(s) Oral every 4 hours PRN  polyethylene glycol 3350 17 Gram(s) Oral daily  senna 2 Tablet(s) Oral at bedtime      SOCIAL HISTORY:    Marital Status:    Occupation:   Lives with:     Substance Use (street drugs):   Tobacco Usage:    Alcohol Usage: Social EtOH    FAMILY HISTORY:  Family history of diabetes mellitus (DM) (Father, Mother)    FH: CAD (coronary artery disease) (Mother)        ROS:  Unobtainable because:   All other systems negative     Constitutional: no fever, no chills, no weight loss, no night sweats  Eye: no eye pain, no redness, no vision changes  ENT:  no sore throat, no rhinorrhea  Cardiovascular:  no chest pain, no palpitation  Respiratory:  no SOB, no cough  GI:  no abd pain, no vomiting, no diarrhea  urinary: no dysuria, no hematuria, no flank pain  : no  discharge or bleeding  musculoskeletal:  no joint pain, no joint swelling  skin:  no rash  neurology:  no headache, no seizure, no change in mental status  psych: no anxiety, no depression     Physical Exam:    General:    NAD, non toxic  Head: atraumatic, normocephalic  Eyes: normal sclera and conjunctiva  ENT:   no oropharyngeal lesions, no LAD, neck supple  Cardio:    regular S1,S2, no murmur  Respiratory:   clear to auscultation b/l, no wheezing  abd:   soft, BS +, not tender, no hepatosplenomegaly  :     no CVAT, no suprapubic tenderness, no graham  Musculoskeletal : no joint swelling, no edema  Skin:    no rash  vascular:  normal pulses  Neurologic:     no focal deficits  psych: normal affect, no suicidal ideation      Drug Dosing Weight  Height (cm): 175.3 (12 Mar 2024 21:15)  Weight (kg): 105.8 (12 Mar 2024 21:15)  BMI (kg/m2): 34.4 (12 Mar 2024 21:15)  BSA (m2): 2.21 (12 Mar 2024 21:15)    Vital Signs Last 24 Hrs  T(F): 102.3 (03-13-24 @ 09:05), Max: 102.3 (03-13-24 @ 09:05)    Vital Signs Last 24 Hrs  HR: 115 (03-13-24 @ 09:05) (107 - 165)  BP: 173/97 (03-13-24 @ 09:05) (146/88 - 178/76)  RR: 20 (03-13-24 @ 09:05)  SpO2: 94% (03-13-24 @ 09:05) (94% - 99%)  Wt(kg): --                          14.4   18.13 )-----------( 265      ( 13 Mar 2024 07:09 )             41.0       03-13    132<L>  |  101  |  37<H>  ----------------------------<  381<H>  3.8   |  20<L>  |  0.92    Ca    9.0      13 Mar 2024 07:09    TPro  7.4  /  Alb  2.4<L>  /  TBili  0.5  /  DBili  x   /  AST  35  /  ALT  60  /  AlkPhos  136<H>  03-13      Urinalysis Basic - ( 13 Mar 2024 07:09 )    Color: x / Appearance: x / SG: x / pH: x  Gluc: 381 mg/dL / Ketone: x  / Bili: x / Urobili: x   Blood: x / Protein: x / Nitrite: x   Leuk Esterase: x / RBC: x / WBC x   Sq Epi: x / Non Sq Epi: x / Bacteria: x        MICROBIOLOGY:  v  .Blood Blood-Peripheral  03-12-24   Growth in anaerobic bottle: Gram positive cocci in pairs  Growth in aerobic bottle: Gram positive cocci in pairs  Direct identification is available within approximately 3-5  hours either by Blood Panel Multiplexed PCR or Direct  MALDI-TOF. Details: [email or web address]  --  Blood Culture PCR          Rapid RVP Result: Detected (03-12 @ 18:21)          RADIOLOGY:       Asked to see this patient at the request of the hospitalist for GPC bacteremia.    HPI:  Patient is a 58 year old Male with PMH of obesity, diabetes, HTN presenting with hyperglycemia.   as per med records patients' daughter had reported that  for past 2w pt has not been acting himself, generalized weakness.   currently patient seen on [facility] floor.  he is lethargic  and has jerk like movement of his right upper extremity.  he does awaken when his name is called and can tell me his name and  and current location but then he doses off again.  He states he came to hospital because of pain in his back and legs.  he denies any sob or cough, denies any chest, his abdomen is distended but he denies any abd. pain.        PAST MEDICAL & SURGICAL HISTORY:  DM (diabetes mellitus)    Morbidly obese    Other and unspecified ventral hernia with obstruction, without gangrene    Morbidly obese    H/O ventral hernia repair            Allergies:    No Known Drug Allergies          ANTIMICROBIALS:  piperacillin/tazobactam IVPB.. 3.375 every 8 hours  vancomycin  IVPB 1250 every 12 hours      OTHER MEDS:  acetaminophen     Tablet .. 650 milliGRAM(s) Oral every 6 hours PRN  aluminum hydroxide/magnesium hydroxide/simethicone Suspension 30 milliLiter(s) Oral every 4 hours PRN  atorvastatin 40 milliGRAM(s) Oral at bedtime  bisacodyl 5 milliGRAM(s) Oral daily PRN  cyclobenzaprine 5 milliGRAM(s) Oral every 8 hours PRN  dextrose 5%. 1000 milliLiter(s) IV Continuous <Continuous>  dextrose 5%. 1000 milliLiter(s) IV Continuous <Continuous>  dextrose 50% Injectable 25 Gram(s) IV Push once  dextrose 50% Injectable 12.5 Gram(s) IV Push once  dextrose 50% Injectable 25 Gram(s) IV Push once  dextrose Oral Gel 15 Gram(s) Oral once PRN  enoxaparin Injectable 110 milliGRAM(s) SubCutaneous every 12 hours  gabapentin 100 milliGRAM(s) Oral every 8 hours  glucagon  Injectable 1 milliGRAM(s) IntraMuscular once  insulin glargine Injectable (LANTUS) 15 Unit(s) SubCutaneous at bedtime  insulin lispro (ADMELOG) corrective regimen sliding scale   SubCutaneous three times a day before meals  insulin lispro (ADMELOG) corrective regimen sliding scale   SubCutaneous at bedtime  insulin lispro Injectable (ADMELOG) 5 Unit(s) SubCutaneous three times a day before meals  lidocaine   4% Patch 1 Patch Transdermal daily  lisinopril 2.5 milliGRAM(s) Oral daily  melatonin 3 milliGRAM(s) Oral at bedtime PRN  metoprolol tartrate 25 milliGRAM(s) Oral two times a day  naloxone Injectable 0.4 milliGRAM(s) IV Push once  ondansetron Injectable 4 milliGRAM(s) IV Push every 8 hours PRN  oxyCODONE    IR 2.5 milliGRAM(s) Oral every 4 hours PRN  oxyCODONE    IR 5 milliGRAM(s) Oral every 4 hours PRN  polyethylene glycol 3350 17 Gram(s) Oral daily  senna 2 Tablet(s) Oral at bedtime            FAMILY HISTORY:  Family history of diabetes mellitus (DM) (Father, Mother)    FH: CAD (coronary artery disease) (Mother)        ROS:    Constitutional: +fever today, no chills, no weight loss, no night sweats  Eye: no eye pain, no redness, no vision changes  ENT:  no sore throat, no rhinorrhea  Cardiovascular:  no chest pain, no palpitation  Respiratory:  no SOB, no cough  GI:  no abd pain, no vomiting, no diarrhea, has distended abdomen  urinary: no dysuria, no hematuria, no flank pain  : no  discharge or bleeding  musculoskeletal:  no jpint pain, states had pain in his back and legs on admission  skin:  no rash  neurology:  no headache    Physical Exam:    General:    Lethargic   Head: atraumatic, normocephalic  Eyes: normal sclera and conjunctiva  ENT:   no oropharyngeal lesions, no LAD, neck supple  Cardio:    regular S1,S2, no murmur  Respiratory:   No wheezing, No crackles  abd:   distended, hard, no tenderness, no guarding, no rebound  :     no CVAT, no suprapubic tenderness, + graham  Musculoskeletal : no joint swelling, no edema  Skin:    no rash  vascular:  normal pulses  Neurologic:    lethargic and has jerk like muscle twitches of RUE AAroselyn x3 when awakens      Drug Dosing Weight  Height (cm): 175.3 (12 Mar 2024 21:15)  Weight (kg): 105.8 (12 Mar 2024 21:15)  BMI (kg/m2): 34.4 (12 Mar 2024 21:15)  BSA (m2): 2.21 (12 Mar 2024 21:15)    Vital Signs Last 24 Hrs  T(F): 102.3 (24 @ 09:05), Max: 102.3 (24 @ 09:05)    Vital Signs Last 24 Hrs  HR: 115 (24 @ 09:05) (107 - 165)  BP: 173/97 (24 @ 09:05) (146/88 - 178/76)  RR: 20 (24 @ 09:05)  SpO2: 94% (24 @ 09:05) (94% - 99%)  Wt(kg): --                          14.4   18.13 )-----------( 265      ( 13 Mar 2024 07:09 )             41.0           132<L>  |  101  |  37<H>  ----------------------------<  381<H>  3.8   |  20<L>  |  0.92    Ca    9.0      13 Mar 2024 07:09    TPro  7.4  /  Alb  2.4<L>  /  TBili  0.5  /  DBili  x   /  AST  35  /  ALT  60  /  AlkPhos  136<H>        Urinalysis Basic - ( 13 Mar 2024 07:09 )    Color: x / Appearance: x / SG: x / pH: x  Gluc: 381 mg/dL / Ketone: x  / Bili: x / Urobili: x   Blood: x / Protein: x / Nitrite: x   Leuk Esterase: x / RBC: x / WBC x   Sq Epi: x / Non Sq Epi: x / Bacteria: x        MICROBIOLOGY:  .Blood Blood-Peripheral  24   Growth in anaerobic bottle: Gram positive cocci in pairs  Growth in aerobic bottle: Gram positive cocci in pairs  Direct identification is available within approximately 3-5  hours either by Blood Panel Multiplexed PCR or Direct  MALDI-TOF. Details: [email or web address]  --  Blood Culture PCR          Rapid RVP Result: Detected ( @ 18:21)  Coronavirus (229E,HKU1,NL63,OC43): Detected (24 @ 18:21)      SARS-CoV-2: NotDetec: This Respiratory Panel uses polymerase chain reaction (PCR) to detect for  adenovirus; coronavirus (HKU1, NL63, 229E, OC43); human metapneumovirus  (hMPV); human enterovirus/rhinovirus (Entero/RV); influenza A; influenza  A/H1; influenza A/H3; influenza A/H1-2009; influenza B; parainfluenza  viruses 1, 2, 3, 4; respiratory syncytial virus; Mycoplasma pneumoniae;  Chlamydophila pneumoniae; and SARS-CoV-2. (24 @ 18:21)    < from: CT Abdomen and Pelvis w/ IV Cont (24 @ 14:58) >  IMPRESSION:  Small bilateral pleural effusions.    Constipated colon, most pronounced in the cecum.    Gastric air-fluid level. Correlate clinically for gastroparesis or   partial gastric outlet obstruction. Single prominent perigastric node   measuring 8 mm.    Hepatic steatosis.      ERIC BRADY MD;Attending Radiologist  This document has been electronically signed. Mar 12 2024  3:10PM    < end of copied text >  < from: CT Angio Head w/ IV Cont (24 @ 17:23) >    ACC: [id number] EXAM:  CT ANGIO NECK (W)AW IC   ORDERED BY: ALEX SANTIAGO     ACC: [id number] EXAM:  CT ANGIO BRAIN (W)AW IC   ORDERED BY: ALEX SANTIAGO     PROCEDURE DATE:  2024          INTERPRETATION:  CT angiography of the brain and neck    CLINICAL INDICATION: Right lower extremity weakness    TECHNIQUE: Direct axial CT scanning of the brain and neck was obtained   from the vertex to the level of the clavicular heads after the dynamic   intravenous injection of 90 cc of Omnipaque 350. 10cc discarded..   Sagittal and coronal maximum intensity projection reformats were   provided.  Three-dimensional reconstructions were performed by the   radiologist using the [employer] workstation.    COMPARISON: CT brain 3/12/2024 obtained at 2:45 PM    FINDINGS:    CTA BRAIN:    Normal flow-related enhancement of the skull base/intracranial internal   carotid arteries.    Normal flow-related enhancement of the bilateral anterior, middle, and   posterior cerebral arteries.    Anterior and rightposterior commuting arteries are visualized.    Normal flow-related enhancement of the bilateral intradural vertebral   arteries and the basilar artery.    No flow-limiting stenosis or vascular aneurysm. No AVM.    Venous system is well opacified, noevidence for venous sinus or cortical   vein thrombosis.    CTA NECK:    Normal flow-related enhancement of the bilateral common and internal   carotid arteries.    Normal flow-related enhancement of the bilateral vertebral arteries.    No flow-limiting stenosis, evidence for arterial dissection, or vascular   aneurysm.    IMPRESSION:    CTA brain:  No flow-limiting stenosis or vascular aneurysm. No AVM.  Venous system is well opacified, no evidence for venous sinus or cortical   vein thrombosis.    CTA neck:  No flow-limiting stenosis, evidence for arterial dissection, or vascular   aneurysm.    --- End of Report ---        SHAISTA DAWN MD; Attending Radiologist  This document has been electronically signed. Mar 12 2024  5:56PM    < end of copied text >        < from: CT Chest No Cont (24 @ 17:19) >  ACC: [id number] EXAM:  CT CHEST   ORDERED BY: ALEX SANTIAGO     PROCEDURE DATE:  2024          INTERPRETATION:  .  Patient: DANY DUEÑAS  : 1965  MRN: [id number]  ACC: [id number]  Order Date: 3/12/2024 5:19 PM  Exam: CT CHEST    INDICATION: sepsis  TECHNIQUE: Unenhanced CT of the chest. Coronal, sagittal, and MIP images   were reconstructed and reviewed.  COMPARISON: No prior chest CT.    FINDINGS:    AIRWAYS, LUNGS, PLEURA: Patent central airways. Dependent opacities   within dependent portion of bilateral lower lobes likely atelectatic. The   lungs are otherwise clear.. No pleural effusion.    LYMPH NODES, MEDIASTINUM: No lymphadenopathy.    HEART, VESSELS: Heart size is normal. No pericardial effusion. Thoracic   aorta normalin diameter. Mild coronary calcification.    VISUALIZED UPPER ABDOMEN: Hepatic steatosis.    CHEST WALL, BONES: No aggressive osseous lesion. Old fracture deformity   of the left ribs.    LOWER NECK: Within normal limits.    IMPRESSION:    Mild bibasilar atelectasis. The lungs are otherwise clear.    --- End of Report ---            BRENDA JACOBSON MD; Attending Radiologist  This document has been electronically signed. Mar 12 2024  5:52PM    < end of copied text >  < from: CT Head No Cont (24 @ 14:49) >    ACC: [id number] EXAM:  CT BRAIN   ORDERED BY: DYAN CHARLES     PROCEDURE DATE:  2024          INTERPRETATION:  History: fall, ams.    CT head 3/12/2024    Thin axial sections through the head obtained from skull base to vertex.   Coronal and sagittal reformatted images provided.    No comparisons available.    There is mild prominence of cortical sulci, fissures and ventricles   related to involutional changes. The gray-white matter differentiation is   preserved. There is no edema, mass effect or midline shift. There is no   acute intracranial  hemorrhage or abnormal extra-axial collections.    There is no acute calvarial fracture. The visualized orbits are   unremarkable. There is a large nasal septal defect and partial absence of   nasal cavity structures including turbinates. The tympanomastoid cavities   are well-aerated. There is bilateral soft tissue opacity within the   external auditory canals likely related to cerumen.    IMPRESSION:    No acute intracranial  hemorrhage, mass effect or calvarial fracture.    --- End of Report ---            ARAVIND AVILA MD; Attending Radiologist  This document has been electronically signed. Mar 12 2024  2:56PM    < end of copied text >      < from: CT Lumbar Spine No Cont (24 @ 14:52) >    ACC: [id number] EXAM:  CT LUMBAR SPINE   ORDERED BY: DYAN CHARLES     PROCEDURE DATE:  2024          INTERPRETATION:  HISTORY: Fall. Back pain.    COMPARISON: None.    TECHNIQUE: Axial noncontrast CT images of the lumbar spine were obtained.   Coronal and sagittal reconstructions were performed. Bone and soft tissue   windows were evaluated.    FINDINGS:    The lumbar lordosis is maintained. No prevertebral soft tissue swelling.   Vertebral body heights are maintained.    There is trace grade 1 retrolisthesis of L2 on L3, L3 on L4, L4 on L5.    Lower lumbar disc bulging indents the ventral thecal sac and encroaches   the inferior foraminal fat bilaterally. Mild L4-L5 vacuum change noted.    Bibasilar discoid atelectasis and/or scarring.    Atherosclerotic calcification of the abdominal aorta and major branch   vessels is noted.    IMPRESSION:    No fracture. Grade 1 retrolisthesis of L2-L5.  Lower lumbar disc bulging.    --- End of Report ---      ALICIA BHATIA MD; Attending Radiologist  This document has been electronically signed. Mar 12 2024  3:15PM    < end of copied text >

## 2024-03-13 NOTE — PROGRESS NOTE ADULT - PROBLEM SELECTOR PLAN 7
etiology unclear  but need to rule acute CVA- especially in light of new onset/ diagnosis  afib of unclear chronicity   obtain MRI   order PT eval etiology unclear  but need to rule acute CVA- especially in light of new onset/ diagnosis  afib of unclear chronicity   obtain MRI   order PT eval   will get ammonia level and neurology consult

## 2024-03-14 NOTE — CHART NOTE - NSCHARTNOTEFT_GEN_A_CORE
While on amio gtt, patient still in afib RVR 150s, RR elevated with occasional pursed lip breathing, Relative hypotension compared to yesterday, mental status altered, lethargic  Appears septic, high risk for decompensation    -Ordered repeat labs, ABG  -ICU called for consult, they agreed for transfer to CCU While on amio gtt, patient still in afib RVR 150s, RR elevated with occasional pursed lip breathing, Relative hypotension compared to yesterday, mental status altered, lethargic  Septic, high risk for decompensation    -Ordered repeat labs, ABG  -ICU called for consult, they agreed for transfer to CCU

## 2024-03-14 NOTE — CONSULT NOTE ADULT - ASSESSMENT
58M year old male w/ hx morbid obese (BMI 39) DM, HTN presenting with hyperglycemia. Daughter reports for past 2w pt has not been acting himself, generalized weakness. with multiples falls. In ED pt found to be septic, hyperglycemic with +RVP for coronavirus and MSSA bacteremia of undetermined source. Hospital course c/b new onset a-fib with RVR started on amio drip. Pt admitted to the ICU for further management.    # Neuro:  //AMS  -Likely 2/2 metabolic encephalopathy vs hepatic encephalopathy   - CTH on admission with no acute intracranial  hemorrhage, mass effect or calvarial fracture.  - Ammonia level on admission 188--> 73 downtrending. Will continue to trend   - C/w lactulose   - Neurology following, recommending EEG to r/o seizures and MRI brain to r/u stroke, will continue to f/u recs   - Will continue to evaluate mental status  -Statin     #Resp:  -satting adequately on RA, maintain sats>92%   - incentive spirometry   - F/u chest x-ray     #CV:  //New onset A-fib with RVR  - s/p BB, s/p Cardizem gtt, now on amio drip   - F/u TTE  - Cardiology following, will continue to f/u recs  - HD stable without vasopressor requirements, will continue to maintain MAP goal>65    #GI:  //? bowel obstruction   - CT abd/pelvis on admission remarkable for constipated colon, most pronounced in the cecum. Gastric air-fluid level. Correlate clinically for gastroparesis or partial gastric outlet obstruction. Single prominent perigastric node measuring 8 mm.  - 3/13/24 s/p manual disimpaction  - Pt on exam with significant abdominal distension. NGT placed for abdominal decompression with 1L output. will continue to monitor output  -Diet: NPO for now   - GI ppx: PPI  - Bowel regimen  - Once patient is more stable will likely obtain a CT abd/pelvis to evaluate for obstruction  - Will continue to monitor for BM and passing gas.     #Renal:  //VICENTA  - graham, cont to monitor strict I/Os  - voiding freely, making adequate UO  - replete lytes as appropriate     #ID:  //Sepsis   - likely 2/2 MSSA bacteremia and RVP (+) positive for other coronavirus  - S/p vanc/zosyn   Will continue empirically treating with Cefazolin   - Pt remains febrile repeat blood cultures done today   - COntinue to tren fever curve  - ID following will continue to f/u recs  - F/u repeat blood cxs, lactate, and procal    #Heme:  //DVT ppx: Lovenox   - cbc stable    #Endo:  //sliding scale   - maintaining goal glucose<180 with ISS  - cont to monitor FS    Case discussed with ICU attending Dr. Horton 58M year old male w/ hx morbid obese (BMI 39) DM, HTN presenting with hyperglycemia. Daughter reports for past 2w pt has not been acting himself, generalized weakness. with multiples falls. In ED pt found to be septic, hyperglycemic with +RVP for coronavirus and MSSA bacteremia of undetermined source. Hospital course c/b new onset a-fib with RVR started on amio drip. Pt admitted to the ICU for further management.    # Neuro:  //AMS  -Likely 2/2 metabolic encephalopathy vs hepatic encephalopathy   - CTH on admission with no acute intracranial  hemorrhage, mass effect or calvarial fracture.  - Ammonia level on admission 188--> 73 downtrending. Will continue to trend and trend for BMs  - C/w lactulose   - Neurology following, recommending EEG to r/o seizures and MRI brain to r/u stroke, will continue to f/u recs   - Will continue to evaluate mental status  -Statin     #Resp:  -satting adequately on RA, maintain sats>92%   - incentive spirometry   - F/u chest x-ray     #CV:  //New onset A-fib with RVR  - s/p BB, Started on Cardizem gtt today however c/b hypotension. Now transitioned to amio drip for better control.  - F/u TTE  - Cardiology following, will continue to f/u recs  - HD stable without vasopressor requirements, will continue to maintain MAP goal>65    #GI:  //? bowel obstruction   - CT abd/pelvis on admission remarkable for constipated colon, most pronounced in the cecum. Gastric air-fluid level. Correlate clinically for gastroparesis or partial gastric outlet obstruction. Single prominent perigastric node measuring 8 mm.  - 3/13/24 s/p manual disimpaction as per chart review   - Pt on exam with significant abdominal distension. NGT placed for abdominal decompression with 1L output. will continue to monitor output  -Diet: NPO for now   - GI ppx: PPI  - Bowel regimen  - Once patient is more stable will likely obtain a CT abd/pelvis to evaluate for obstruction  - Will continue to monitor for BM and passing gas.     #Renal:  //VICENTA  - graham, cont to monitor strict I/Os  - replete lytes as appropriate     #ID:  //Sepsis   - likely 2/2 MSSA bacteremia and RVP (+) positive for other coronavirus  - S/p vanc/zosyn   Will continue empirically treating with Cefazolin   - Pt remains febrile repeat blood cultures done today   - COntinue to tren fever curve  - ID following will continue to f/u recs  - F/u repeat blood cxs, lactate, and procal    #Heme:  //DVT ppx: Lovenox   - cbc stable    #Endo:  //sliding scale   - maintaining goal glucose<180 with ISS  - cont to monitor FS    Case discussed with ICU attending Dr. Horton

## 2024-03-14 NOTE — PROGRESS NOTE ADULT - SUBJECTIVE AND OBJECTIVE BOX
Patient is a 58y old  Male who presents with a chief complaint of sepsis, new Afib (14 Mar 2024 16:23)      Interval History: finger sticks are in 300-400 range   Infection driven Hyperglycemia   on Lantus 26 and and Lispro sliding scale coverage with meals     MEDICATIONS  (STANDING):  aMIOdarone Infusion 1 mG/Min (33.3 mL/Hr) IV Continuous <Continuous>  aMIOdarone Infusion 0.5 mG/Min (16.7 mL/Hr) IV Continuous <Continuous>  ceFAZolin   IVPB 2000 milliGRAM(s) IV Intermittent every 8 hours  ceFAZolin   IVPB      chlorhexidine 2% Cloths 1 Application(s) Topical <User Schedule>  enoxaparin Injectable 110 milliGRAM(s) SubCutaneous every 12 hours  insulin glargine Injectable (LANTUS) 26 Unit(s) SubCutaneous at bedtime  insulin lispro (ADMELOG) corrective regimen sliding scale   SubCutaneous every 4 hours  lactulose Syrup 30 Gram(s) Oral every 6 hours  lisinopril 2.5 milliGRAM(s) Oral daily  morphine  - Injectable 2 milliGRAM(s) IV Push once    MEDICATIONS  (PRN):  bisacodyl 5 milliGRAM(s) Oral daily PRN Constipation  ondansetron Injectable 4 milliGRAM(s) IV Push every 8 hours PRN Nausea and/or Vomiting      Allergies    No Known Allergies    Intolerances        REVIEW OF SYSTEMS:  CONSTITUTIONAL: no changes  EYES: No eye pain, visual disturbances, or discharge  ENMT:  No difficulty hearing, No sinus or throat pain  NECK: No pain or stiffness  RESPIRATORY: No cough, wheezing, chills or hemoptysis; No shortness of breath  CARDIOVASCULAR: No chest pain, palpitations or leg swelling  GASTROINTESTINAL: No abdominal or epigastric pain. No nausea, vomiting, or hematemesis; No diarrhea or constipation. No melena or hematochezia.  GENITOURINARY: No dysuria, frequency, hematuria, or incontinence  NEUROLOGICAL: No headaches, memory loss, loss of strength, numbness, or tremors  SKIN: No itching, burning, rashes, or lesions   ENDOCRINE: No heat or cold intolerance; No hair loss  MUSCULOSKELETAL: No joint pain or swelling; No muscle, back, or extremity pain  PSYCHIATRIC: No depression, anxiety, mood swings, or difficulty sleeping  HEME/LYMPH: No easy bruising, or bleeding gums  ALLERY AND IMMUNOLOGIC: No hives or eczema    Vital Signs Last 24 Hrs  T(C): 38.2 (14 Mar 2024 19:30), Max: 38.8 (14 Mar 2024 03:35)  T(F): 100.8 (14 Mar 2024 19:30), Max: 101.9 (14 Mar 2024 15:00)  HR: 152 (14 Mar 2024 22:00) (81 - 180)  BP: 143/115 (14 Mar 2024 22:00) (96/80 - 185/169)  BP(mean): 126 (14 Mar 2024 22:00) (76 - 176)  RR: 32 (14 Mar 2024 22:00) (20 - 62)  SpO2: 96% (14 Mar 2024 22:00) (87% - 100%)    Parameters below as of 14 Mar 2024 07:41  Patient On (Oxygen Delivery Method): nasal cannula  O2 Flow (L/min): 3      PHYSICAL EXAM:  GENERAL:   HEAD: Atraumatic, Normocephalic  EYES: PERRLA, conjunctiva and sclera clear  ENMT: No  exudates,; Moist mucous membranes,, No lesions  NECK: Supple, No JVD, Normal thyroid  NERVOUS SYSTEM:  Alert & Oriented,   CHEST/LUNG: Clear to auscultation bilaterally; No rales, rhonchi, wheezing, or rubs  HEART: Regular rate and rhythm; No murmurs, rubs, or gallops  ABDOMEN: Soft, Nontender, Nondistended; Bowel sounds present  EXTREMITIES:  2+ Peripheral Pulses, no edema  SKIN: No rashes or lesions    LABS:      Urinalysis Basic - ( 14 Mar 2024 10:49 )    Color: x / Appearance: x / SG: x / pH: x  Gluc: 438 mg/dL / Ketone: x  / Bili: x / Urobili: x   Blood: x / Protein: x / Nitrite: x   Leuk Esterase: x / RBC: x / WBC x   Sq Epi: x / Non Sq Epi: x / Bacteria: x      CAPILLARY BLOOD GLUCOSE      POCT Blood Glucose.: 407 mg/dL (14 Mar 2024 21:41)  POCT Blood Glucose.: 358 mg/dL (14 Mar 2024 18:39)  POCT Blood Glucose.: 333 mg/dL (14 Mar 2024 11:28)  POCT Blood Glucose.: 393 mg/dL (14 Mar 2024 11:26)  POCT Blood Glucose.: 345 mg/dL (14 Mar 2024 08:00)  POCT Blood Glucose.: 252 mg/dL (14 Mar 2024 04:01)    Lipid panel:       ABG - ( 14 Mar 2024 09:58 )  pH, Arterial: 7.39  pH, Blood: x     /  pCO2: 30    /  pO2: 77    / HCO3: 18    / Base Excess: -5.6  /  SaO2: 95.9                Thyroid:03-14 @ 04:20 TSH 0.105 <<Free T4>> 1.3 <<T3>> -- <<TG Ab>> -- <<ATPOAB>> -- <<TBG>> -- <<TSI>> -- Reverse T3 --    Diabetes Tests:  Parathyroid Panel:  Adrenals:  RADIOLOGY & ADDITIONAL TESTS:    Imaging Personally Reviewed:  [ ] YES  [ ] NO    Consultant(s) Notes Reviewed:  [ ] YES  [ ] NO    Care Discussed with Consultants/Other Providers [ ] YES  [ ] NO

## 2024-03-14 NOTE — DIETITIAN INITIAL EVALUATION ADULT - FUNCTIONAL SCREEN CURRENT LEVEL: SWALLOWING (IF SCORE 2 OR MORE FOR ANY ITEM, CONSULT REHAB SERVICES), MLM)
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    He reports that he is having bothersome \"hot flashes\". He does f/u with Endocrine next wk. He otherwise reports a good appetite and feels well. No other complaints to report. Evaluated by Dr. Chance prior to our visit and diplopia has resolved. Vision reported stable by pt.      Problem List:  Patient Active Problem List   Diagnosis   • Groin pain   • Right buttock pain   • Numbness in left leg   • Osteoarthrosis, unspecified whether generalized or localized, pelvic region and thigh   • Orthostatic hypotension   • Nausea   • Anorexia   • Lethargy   • Ketonuria   • Undiagnosed cardiac murmurs   • Diplopia   • Suprasellar mass   • Coronary artery disease involving native coronary artery of native heart without angina pectoris   • History of pituitary adenoma   • History of pituitary surgery        ALLERGIES:   Allergen Reactions   • Penicillin G HIVES       Current Outpatient Prescriptions   Medication Sig Dispense Refill   • hydrocortisone (CORTEF) 20 MG tablet Take 1 tablet by mouth 2 times daily. Take one pill with breakfast, and one pill in the afternoon (around 3-4 pm). 60 tablet 2   • aspirin 81 MG tablet Take 1 tablet by mouth every evening. Resume taking aspirin 1 week after surgery.     • sodium chloride (OCEAN) 0.65 % nasal spray Spray 1 spray in each nostril every 4 hours (while awake). And as needed 30 mL    • acetaminophen (TYLENOL) 325 MG tablet Take 2 tablets by mouth every 4 hours as needed for Pain. Please discontinue using aspirin for pain. 30 tablet 0   • simvastatin (ZOCOR) 20 MG tablet Take 20 mg by mouth every evening.        No current facility-administered medications for this encounter.         Past Medical History:   Diagnosis Date   • Actinic keratosis 03/28/2017    sebaceous cyst   • Arthritis    • Colon  polyps 07/09/2015   • Coronary artery disease 11/01/2007    4 stents   • Coronary artery disease involving native coronary artery of native heart without angina pectoris 04/04/2017    Stents in 2007, [place]    • Diplopia 03/21/2017    suprasellar mass   • Dizzy spells    • Dupuytren's contracture of both hands 12/15/2014   • Generalized weakness 03/15/2017    orthostatic hypotension   • Hearing deficit     does not wear aids   • Hyperlipidemia 07/13/2015   • Old myocardial infarction 11/01/2007   • Osteoarthrosis 01/16/2015   • Other and unspecified hyperlipidemia     resolved   • Photokeratitis of left eye 05/29/2015   • Right groin pain 12/15/2014   • Right hip pain    • Sleeping difficulty     frequently wakes up in the middle of the night   • Urinary frequency 2017   • Ventral hernia 07/09/2015   • Wears reading eyeglasses        Past Surgical History:   Procedure Laterality Date   • COLONOSCOPY  2/5/13    5 year recall   • COLONOSCOPY W BIOPSY  01/30/2002    hemorrhoids, mild diverticulosis, tubular adenomas from cecum x2, ascending x2, sigmoid x2   • COLONOSCOPY W BIOPSY  12/04/2009    polyps X8, diverticulosis,f/u 3 years,Dr Prado   • CORONARY ART DIL,ONE VESSEL  11/01/2007    Angioplasty (coronary) 4 stents   • STAPEDECTOMY  04/21/1976    left       Social History     Social History   • Marital status:      Spouse name: N/A   • Number of children: N/A   • Years of education: N/A     Occupational History   • Not on file.     Social History Main Topics   • Smoking status: Former Smoker     Packs/day: 1.00     Years: 20.00     Types: Cigarettes     Quit date: 1/1/1977   • Smokeless tobacco: Never Used   • Alcohol use Yes      Comment: Occassionally about 1 beer a month   • Drug use: No   • Sexual activity: Not on file     Other Topics Concern   • Not on file     Social History Narrative       Family History   Problem Relation Age of Onset   • Cancer Mother      breast age 42   • Cancer Father      lung    •  Heart Sister    • Heart Brother    • Heart Brother    • Diabetes Son      prediabetes   • Diabetes Son      prediabetes        There were no vitals taken for this visit.     REVIEW OF SYSTEMS:  GENERAL:  Patient denies fever, chills, tiredness, malaise.  EYES:  Patient denies blurred vision, double vision, pain, burning and itching.   IMMUNOLOGIC:  Patient denies hayfever, drug allergies.  NEUROLOGIC: Patient denies symptoms except as described in the HPI.   ENDOCRINE:  Patient denies excessive thirst, heat intolerance, lymphnode enlargement.  GASTROINTESTINAL:  Patient denies abdominal pain, nausea/vomiting, indigestion/heartburn, diarrhea, constipation.  CARDIOVASCUALR:  Patient denies chest pain, varicose veins, high blood pressure.  SKIN:  Patient denies skin rashes, boils, persistent itching, acne.  MUSCULOSKELETAL:  Patient denies joint pain, neck pain, back pain, leg swelling.  ENT/MOUTH:  Patient denies ear infections, sore throats, sinus problems, hearing loss.  :  Patient denies urine retention, painful urination, urinary frequency, blood in urine, nocturia.  RESPIRATORY:  Patient denies wheezing, frequent cough, shortness of breath.      Exam:  General: The patient is well developed, well nourished and  in no apparent distress.    Mental Status: The patient is awake, alert, oriented to person, place, and time. There is no dysarthria.     CN: Pupils were equal, round, and reactive to light bilaterally and consensually. EOMI without nystagmus. Visual fields were full to confrontation. Face was symmetric. There was intact light touch to V1, V2, V3.  Hearing intact bilaterally. Palate elevates symmetrically. Shoulder shrug was normal. Tongue protruded midline.    Motor: Tone was normal in all four extremities without fasciculations, atrophy, or myoclonus.  There were no abnormal movements. Strength was intact in bilateral upper and lower extremities.    Sensory: Intact to light touch throughout.      Cerebellar: There was no dysmetria. Fine motor coordination was intact.    Gait: Not assessed.    Review of Diagnostic Data:    Dr. Joyner reviewed with patient and wife the MRI pre-op and compared MRI 4/7/17. The normal pituitary gland has been pushed over and there is some residual tumor present from debulking w/o mass effect.     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 He appears to be doing well with resolution of diplopia, however he does have some hot flashes which are concerning to him at this time.    Plan:  F/u with NSG for repeat clinic visit and MRI in 3 months  Endocrine-He has an appt 5/22, to help address his hot flashes.  ENT-He has an appt 5/23  Neuro-optho-prn.    URIEL Sarkar  Office: [phone]  [phone]   0 = swallows foods/liquids without difficulty

## 2024-03-14 NOTE — CHART NOTE - NSCHARTNOTEFT_GEN_A_CORE
:  [address]    Indication:  afib with rvr     PROCEDURE:  [x ] LIMITED ECHO  [x ] LIMITED CHEST  [ ] LIMITED RETROPERITONEAL  [x ] LIMITED ABDOMINAL  [ ] LIMITED DVT  [ ] NEEDLE GUIDANCE VASCULAR  [ ] NEEDLE GUIDANCE THORACENTESIS  [ ] NEEDLE GUIDANCE PARACENTESIS  [ ] NEEDLE GUIDANCE PERICARDIOCENTESIS  [ ] OTHER    FINDINGS:  Chest: A-line predominant, normal aeration pattern bilat. No effusions bilat. bibasilar consolidations     ECHO: LV>RV without WMA but unable to determine systolic function with rapid HR   No pericardial effusion  IVC: unable to visualize    Abd: large liver with large fluid filled stomach     INTERPRETATION:  lung exam with small bibasilar consolidations   LV>RV but unable to determine LV function with RVR

## 2024-03-14 NOTE — PROGRESS NOTE ADULT - SUBJECTIVE AND OBJECTIVE BOX
[facility]  INFECTIOUS DISEASES   [address] [place] [address]  Tel: [phone]     Fax: [phone]  ==============================================================================  MD Salomon Allen, DO Johanna Martinez, NP   ==============================================================================      DANY DUEÑAS  MRN-[id number]  58y ([birth year])      Interval History:    ROS:    [ ] Unobtainable because:  [ ] All other systems negative except as noted    Constitutional: no fever, no chills  Head: no trauma  Eyes: no vision changes, no eye pain  ENT:  no sore throat, no rhinorrhea  Cardiovascular:  no chest pain, no palpitation  Respiratory:  no SOB, no cough  GI:  no abd pain, no vomiting, no diarrhea  urinary: no dysuria, no hematuria, no flank pain  musculoskeletal:  no joint pain, no joint swelling  skin:  no rash  neurology:  no headache, no seizure, no change in mental status  psych: no anxiety, no depression         Allergies  No Known Allergies        ANTIMICROBIALS:  ceFAZolin   IVPB    ceFAZolin   IVPB 2000 every 8 hours        Physical Exam:  Vital Signs Last 24 Hrs  T(C): 38.3 (14 Mar 2024 07:41), Max: 38.8 (14 Mar 2024 03:35)  T(F): 101 (14 Mar 2024 07:41), Max: 101.8 (14 Mar 2024 03:35)  HR: 144 (14 Mar 2024 07:56) (90 - 180)  BP: 107/77 (14 Mar 2024 07:56) (107/77 - 169/71)  BP(mean): --  RR: 28 (14 Mar 2024 07:56) (20 - 28)  SpO2: 94% (14 Mar 2024 07:56) (87% - 98%)    Parameters below as of 14 Mar 2024 07:41  Patient On (Oxygen Delivery Method): nasal cannula  O2 Flow (L/min): 3      03-13-24 @ 07:01  -  03-14-24 @ 07:00  --------------------------------------------------------  IN: 0 mL / OUT: 1775 mL / NET: -1775 mL      General:    NAD,  non toxic  Head: atraumatic, normocephalic  Eye: normal sclera and conjunctiva  ENT:    no oral lesions, neck supple  Cardio:     regular S1, S2,  no murmur  Respiratory:    clear b/l,    no wheezing  abd:     soft,   BS +,   no tenderness  :   no CVAT,  no suprapubic tenderness,   no  graham  Musculoskeletal:   no joint swelling,   no edema  vascular: no central lines, +PIV   Skin:    no rash  Neurologic:     no focal deficit  psych: normal affect    WBC Count: 17.88 K/uL (03-14 @ 04:20)  WBC Count: 18.13 K/uL (03-13 @ 07:09)  WBC Count: 21.20 K/uL (03-12 @ 12:15)                            15.4   17.88 )-----------( 234      ( 14 Mar 2024 04:20 )             43.2       03-14    136  |  103  |  60<H>  ----------------------------<  276<H>  4.2   |  22  |  1.17    Ca    9.1      14 Mar 2024 04:20  Phos  3.2     03-14  Mg     2.8     03-14    TPro  7.4  /  Alb  2.4<L>  /  TBili  0.5  /  DBili  x   /  AST  35  /  ALT  60  /  AlkPhos  136<H>  03-13      Urinalysis Basic - ( 14 Mar 2024 04:20 )    Color: x / Appearance: x / SG: x / pH: x  Gluc: 276 mg/dL / Ketone: x  / Bili: x / Urobili: x   Blood: x / Protein: x / Nitrite: x   Leuk Esterase: x / RBC: x / WBC x   Sq Epi: x / Non Sq Epi: x / Bacteria: x          Creatinine Trend: 1.17<--, 0.92<--, 1.11<--, 1.46<--  Lactate, Blood: 1.9 mmol/L (03-14-24 @ 04:20)  Lactate, Blood: 2.1 mmol/L (03-13-24 @ 09:28)  Blood Gas Venous - Lactate: 2.80 mmol/L (03-12-24 @ 15:44)  Blood Gas Venous - Lactate: 4.10 mmol/L (03-12-24 @ 13:55)  Lactate, Blood: 3.6 mmol/L (03-12-24 @ 12:15)      MICROBIOLOGY:  v  Clean Catch Clean Catch (Midstream)  03-12-24   No growth  --  --      .Blood Blood-Peripheral  03-12-24   Growth in aerobic and anaerobic bottles: Staphylococcus aureus  Direct identification is available within approximately 3-5  hours either by Blood Panel Multiplexed PCR or Direct  MALDI-TOF. Details: [email or web address]  --  Blood Culture PCR            Rapid RVP Result: Detected (03-12 @ 18:21)                  SARS-CoV-2: NotDetec (03-12-24 @ 18:21)  Rapid RVP Result: Detected (03-12-24 @ 18:21)        RADIOLOGY:   [facility]  INFECTIOUS DISEASES   [address] [place] [address]  Tel: [phone]     Fax: [phone]  ==============================================================================  MD Salomon Allen, DO Johanna Martinez, NP   ==============================================================================      DANY DUEÑAS  MRN-[id number]  58y ([birth year])      Interval History:    Patient seen and examined today in CCU.  he is s/p multiple RRT last night and rapid a.fib as well and decreased mentation and hence was transferred to CCU .    remains lethargic  and continues to have tremors of the UE.  is about to get EEG currently.    he was also noted to have very high ammonia level and has been started on lactulose as per CU team.  also he is febrile today.    he opens his eyes when  his name is called but is oriented to name only.        ROS:    can not answer most questions as he is quite leathrgic         Allergies  No Known Drug  Allergies        ANTIMICROBIALS:  ceFAZolin   IVPB    ceFAZolin   IVPB 2000 every 8 hours        Physical Exam:  Vital Signs Last 24 Hrs  T(C): 38.3 (14 Mar 2024 07:41), Max: 38.8 (14 Mar 2024 03:35)  T(F): 101 (14 Mar 2024 07:41), Max: 101.8 (14 Mar 2024 03:35)  HR: 144 (14 Mar 2024 07:56) (90 - 180)  BP: 107/77 (14 Mar 2024 07:56) (107/77 - 169/71)  BP(mean): --  RR: 28 (14 Mar 2024 07:56) (20 - 28)  SpO2: 94% (14 Mar 2024 07:56) (87% - 98%)    Parameters below as of 14 Mar 2024 07:41  Patient On (Oxygen Delivery Method): nasal cannula  O2 Flow (L/min): 3      03-13-24 @ 07:01  -  03-14-24 @ 07:00  --------------------------------------------------------  IN: 0 mL / OUT: 1775 mL / NET: -1775 mL      General:    Lethargic   Head: atraumatic, normocephalic  Eyes: normal sclera and conjunctiva  ENT:   no oropharyngeal lesions, no LAD, neck supple  Cardio:    regular S1,S2, no murmur  Respiratory:   No wheezing, No crackles  abd:   distended, less hard, no tenderness, no guarding, no rebound  :     no CVAT, no suprapubic tenderness, + graham  Musculoskeletal : no joint swelling, no edema  Skin:    no rash  vascular:  normal pulses  Neurologic:    lethargic and has jerk like muscle twitches of b/l UE , AAo x 1 today when awakens    WBC Count: 17.88 K/uL (03-14 @ 04:20)  WBC Count: 18.13 K/uL (03-13 @ 07:09)  WBC Count: 21.20 K/uL (03-12 @ 12:15)                            15.4   17.88 )-----------( 234      ( 14 Mar 2024 04:20 )             43.2       03-14    136  |  103  |  60<H>  ----------------------------<  276<H>  4.2   |  22  |  1.17    Ca    9.1      14 Mar 2024 04:20  Phos  3.2     03-14  Mg     2.8     03-14    TPro  7.4  /  Alb  2.4<L>  /  TBili  0.5  /  DBili  x   /  AST  35  /  ALT  60  /  AlkPhos  136<H>  03-13      Urinalysis Basic - ( 14 Mar 2024 04:20 )    Color: x / Appearance: x / SG: x / pH: x  Gluc: 276 mg/dL / Ketone: x  / Bili: x / Urobili: x   Blood: x / Protein: x / Nitrite: x   Leuk Esterase: x / RBC: x / WBC x   Sq Epi: x / Non Sq Epi: x / Bacteria: x          Creatinine Trend: 1.17<--, 0.92<--, 1.11<--, 1.46<--  Lactate, Blood: 1.9 mmol/L (03-14-24 @ 04:20)  Lactate, Blood: 2.1 mmol/L (03-13-24 @ 09:28)  Blood Gas Venous - Lactate: 2.80 mmol/L (03-12-24 @ 15:44)  Blood Gas Venous - Lactate: 4.10 mmol/L (03-12-24 @ 13:55)  Lactate, Blood: 3.6 mmol/L (03-12-24 @ 12:15)      MICROBIOLOGY:    Clean Catch Clean Catch (Midstream)  03-12-24   No growth  --  --      .Blood Blood-Peripheral  03-12-24   Growth in aerobic and anaerobic bottles: Staphylococcus aureus  Direct identification is available within approximately 3-5  hours either by Blood Panel Multiplexed PCR or Direct  MALDI-TOF. Details: [email or web address]  --  Blood Culture PCR        Rapid RVP Result: Detected (03-12 @ 18:21)          SARS-CoV-2: NotDetec (03-12-24 @ 18:21)  Rapid RVP Result: Detected (03-12-24 @ 18:21)        RADIOLOGY:    < from: Xray Kidney Ureter Bladder (03.14.24 @ 08:33) >  ACC: [id number] EXAM:  XR KUB 1 VIEW   ORDERED BY: SCOTTY LÓPEZ     PROCEDURE DATE:  03/14/2024          INTERPRETATION:  KUB: AP    COMPARISON: .    CLINICAL INFORMATION: Abdominal pain.    FINDINGS:  Marked air distended colon throughout the abdomen.  Small bowel pattern nonspecific.  Continued surveillance recommended.    No free intra-abdominal air.  No abnormal calcifications.  The osseous structures are intact.    IMPRESSION:    Marked air distended colon. Follow-up imaging recommended.    --- End of Report ---        JINA LAZO MD; Attending Radiologist  This document has been electronically signed. Mar 14 2024  9:04AM    < end of copied text >  < from: CT Abdomen and Pelvis w/ IV Cont (03.12.24 @ 14:58) >    ACC: [id number] EXAM:  CT ABDOMEN AND PELVIS IC   ORDERED BY: DYAN CHARLES     PROCEDURE DATE:  03/12/2024          INTERPRETATION:  CLINICAL INFORMATION: 58 years  Male with diffuse abd   pain.    COMPARISON: None.    CONTRAST/COMPLICATIONS:  IV Contrast: Omnipaque 350  90 cc administered   10 cc discarded  Oral Contrast: NONE  Complications: None reported at time of study completion    PROCEDURE:  CT of the Abdomen and Pelvis was performed.  Sagittal and coronal reformats were performed.    Limitations: Motion artifact. Streak artifact from patient's arms.    FINDINGS:  LOWER CHEST: Bilateral lower lobe discoid atelectasis. Trace bilateral   pleural effusions.    LIVER: Steatosis.  BILE DUCTS: Normal caliber.  GALLBLADDER: Within normal limits.  SPLEEN: Within normal limits.  PANCREAS: Within normal limits.  ADRENALS: Within normal limits.  KIDNEYS/URETERS: Within normal limits.    BLADDER: Collapsed around Graham catheter.  REPRODUCTIVE ORGANS: Prostate within normal limits.    BOWEL: Nobowel obstruction. Appendix is not visualized. No evidence of   inflammation in the pericecal region.. Moderate colonic stool burden,   most pronounced in the cecum. Gastric air-fluid level  PERITONEUM: No ascites.  VESSELS: Atherosclerotic changes.  RETROPERITONEUM/LYMPH NODES: 8mm perigastric lymph node (2:38)..  ABDOMINAL WALL: Mild bilateral fat-containing inguinal hernias.  BONES: Within normal limits.    IMPRESSION:  Small bilateral pleural effusions.    Constipated colon, most pronounced in the cecum.    Gastric air-fluid level. Correlate clinically for gastroparesis or   partial gastric outlet obstruction. Single prominent perigastric node   measuring 8 mm.    Hepatic steatosis.        --- End of Report ---            ERIC BRADY MD;Attending Radiologist  This document has been electronically signed. Mar 12 2024  3:10PM    < end of copied text >

## 2024-03-14 NOTE — PROGRESS NOTE ADULT - ASSESSMENT
A/P-  58 year old male with morbid obese (BMI 39) DM, HTN presented  with hyperglycemia and weakness   on admission  found to be septic, hyperglycemic with +RVP for coronavirus and MSSA bacteremia of undetermined source.      new onset a-fib with RVR started on amio drip and is in CCU now  he also noted to have elevated ammonia level - has been started  on lactulose as per CCU team  febrile  MSSA bacteremia- source not defined yet    metabolic encephalopathy    plan-  continue with cefazolin for MSSA bacteremia, day #2  check 2 more blood cx.  await TTE  to rule out IE  high ammonia etiology unclear at this time, ct abd no mention of any cirrhosis   advise to check acute viral hepatitis panel as well.  a.fib management as per cardiology and ccu team.  EEG in progress.    All labs and imaging and chart notes reviewed.     critical care time spent 40 minutes.    Miguel Patel MD  Infectious Disease Attending    for any questions please do not hesitate to contact me either via teams or by calling [phone]       A/P-  58 year old male with morbid obese (BMI 39) DM, HTN presented  with hyperglycemia and weakness   on admission  found to be septic, hyperglycemic with +RVP for coronavirus and MSSA bacteremia of undetermined source.     new onset a-fib with RVR started on amio drip and is in CCU now  he also noted to have elevated ammonia level - has been started  on lactulose as per CCU team  febrile  MSSA bacteremia- source not defined yet  TTE- no vegetation as per report.    metabolic encephalopathy    plan-  continue with cefazolin for MSSA bacteremia, day #2  check 2 more blood cx.  if bacteremia does not clear will need JORDAN.  high ammonia etiology unclear at this time, ct abd no mention of any cirrhosis   advise to check acute viral hepatitis panel as well.  a.fib management as per cardiology and ccu team.  EEG in progress.    All labs and imaging and chart notes reviewed.     critical care time spent 40 minutes.    Miguel Patel MD  Infectious Disease Attending    for any questions please do not hesitate to contact me either via teams or by calling [phone]

## 2024-03-14 NOTE — PROGRESS NOTE ADULT - SUBJECTIVE AND OBJECTIVE BOX
Neurology Progress Note    Events noted. In ICU    Neuro Exam: Lethargic/drowsy and opens eyes to voice briefly. Oriented to self, place, and month. Follows commands. No dysarthria. No facial droop. PERRL. Right leg weakness. Intermittent myoclonus noted of extremities.    MRI brain- pending  Echo- limited but normal EF  CTA head/neck- no significant stenosis  HgbA1c- 9.3  LDL-  EEG- no seizure focus    A/P:   Hepatic encephalopathy  Hyperammonemia  Right leg weakness  Sepsis  Abdominal distention  New onset A-fib  Back pain  DM2  HTN  TSH low    - MRI brain and L-spine pending. Not stable to go down for testing  - on full dose Lovenox for possible stroke prevention. Statin held due to liver dysfunction  - EEG shows no seizure focus.  - ammonia trending down  - Echo pending   - neuro unchanged.

## 2024-03-14 NOTE — DIETITIAN INITIAL EVALUATION ADULT - PERTINENT MEDS FT
MEDICATIONS  (STANDING):  aMIOdarone Infusion 0.5 mG/Min (16.7 mL/Hr) IV Continuous <Continuous>  aMIOdarone Infusion 1 mG/Min (33.3 mL/Hr) IV Continuous <Continuous>  ceFAZolin   IVPB      ceFAZolin   IVPB 2000 milliGRAM(s) IV Intermittent every 8 hours  chlorhexidine 2% Cloths 1 Application(s) Topical <User Schedule>  enoxaparin Injectable 110 milliGRAM(s) SubCutaneous every 12 hours  insulin glargine Injectable (LANTUS) 26 Unit(s) SubCutaneous at bedtime  insulin lispro (ADMELOG) corrective regimen sliding scale   SubCutaneous every 6 hours  lisinopril 2.5 milliGRAM(s) Oral daily    MEDICATIONS  (PRN):  bisacodyl 5 milliGRAM(s) Oral daily PRN Constipation  ondansetron Injectable 4 milliGRAM(s) IV Push every 8 hours PRN Nausea and/or Vomiting

## 2024-03-14 NOTE — RAPID RESPONSE TEAM SUMMARY - NSSITUATIONBACKGROUNDRRT_GEN_ALL_CORE
57yo M h/o morbid obesity (BMI 39), DM2, HTN, presenting with hyperglycemia, altered mental status, and falls.  Admitted to telemetry with sepsis 2/2 MSSA bacteremia, new afib with RVR, common coronaravirus URI, hepatic encephalopathy with elevated ammonia, ruling out CVA, and uncontrolled DM2.    Multiple RRT for afib RVR  On this admission not responding to IVP metoprolol, responded to IVP cardizem  Started on cardizem gtt @10 ml/hr    RRT called for afib RVR 150s  
59 YO male with PMH morbid obesity (BMI 39), DM2, HTN, presenting with hyperglycemia, altered mental status, and falls.  Admitted to telemetry with sepsis 2/2 MSSA bacteremia, new afib, hepatic encephalopathy with elevated ammonia, ruling out CVA, and uncontrolled DM2.    RRT called for HR up to 180s as per tele tech.  Pt seen bedside, noted upper extremity jerking movements, but it is arousable and alert x2.  Can answer questions and moves all extremities.  Groin temp 100.5. Afib on monitor to 170s, BP elevated.  Given tylenol for fever, 1L bolus, and initially 5mg IVP metoprolol without improvement.  Then given 20mg IVP cardizem with improvement or HR to 120s.    Will start PO cardizem. Consider cardizem infusion if with recurrent afib RVR.  620 at bedside.  Attending of record Gary notified of above.  
This is a 59yo M h/o morbid obesity (BMI 39), DM2, HTN, presenting with hyperglycemia, altered mental status, and falls.  Admitted to telemetry with sepsis 2/2 MSSA bacteremia, new afib with RVR, common coronaravirus URI, hepatic encephalopathy with elevated ammonia, ruling out CVA, and uncontrolled DM2.    Pt was RRT during the day for HR up to 180s with upper extremity jerking movements.    RRT called now by RN for heart rate to 180 with fever to 101.8 and increased lethargy.  pt finished ofirmev at time of team arrival.    Pt has received po and pr lactulose without bm.

## 2024-03-14 NOTE — RAPID RESPONSE TEAM SUMMARY - NSMEDICATIONSRRT_GEN_ALL_CORE
-Cardizem gtt increased to 15 ml/hr; no response to additional trial metoprolol IVP  -If still not responding will start amio drip  -IV Tylenol, LR bolus   -On full dose lovenox for AC  -Mag citrate for abdominal distention no BMs overnight, despite multiple doses lactulose  -Continue cefazolin for MSSA bacteremia  -Pending TTE read  Hospitalist present at RRT, agrees with plan    CCT 29 mins Afib RVR in setting of MSSA bacteremia + coronavirus  -Cardizem gtt increased to 15 ml/hr; no response to additional trial metoprolol IVP  -If still not responding will start amio drip  -IV Tylenol, LR bolus   -On full dose lovenox for AC  -Mag citrate for abdominal distention no BMs overnight, despite multiple doses lactulose  -ID following; on cefazolin for MSSA bacteremia  -Pending TTE read  Hospitalist present at RRT, agrees with plan    CCT 39 mins

## 2024-03-14 NOTE — CONSULT NOTE ADULT - SUBJECTIVE AND OBJECTIVE BOX
CHIEF COMPLAINT: AMS    HPI:  58M w/ hx morbid obese (BMI 39) diabetes, HTN presenting with hyperglycemia. Daughter reports for past 2w pt has not been acting himself, generalized weakness. Yesterday fell a couple times after which he had difficulty moving RLE. Poor appetite. +abd pain diffusely, none currently No fever, cp, sob, n/v. Hx alcohol used but stopped 2yr ago.  Pt endorses pain in his lower back and his shoulder which isn't new but worse than normal. Pt endorses passing gas but is constipated last BM was 3 days ago. Says he was taking oxycodone and that might be the cause. Does endorse a generalized abdominal pain due to distension. In ED pt originally with afib. s/p cardizem now sinus.  (12 Mar 2024 22:11)    Brief Hospital Course:  Pt admitted to medicine service with sepsis, hyperglycemic with +RVP for coronavirus and MSSA bacteremia of undetermined source. Hospital course c/b new onset a-fib w/ RVR. Pt with multiple RRTs yesterday evening and today for a-fib with RVR not responding to IVP metoprolol. Today started on Cardizem gtt s/p Cardizem IVP. Pt with persistent AMS, lethargic and hypotensive today after cardizem was started and RRT called. ICU consult called for evaluation of persistent a-fib.     Subjective:  Pt seen and examined at bedside. Pt laying in bed appears lethargic moving all extremities. Pt started on amio drip at while at bedside s/p amio bolus. Systolic >110s HR 150s. POCUS preformed at bedside.         PAST MEDICAL & SURGICAL HISTORY:  DM (diabetes mellitus)      Morbidly obese      Other and unspecified ventral hernia with obstruction, without gangrene      Morbidly obese      H/O ventral hernia repair  2006          FAMILY HISTORY:  Family history of diabetes mellitus (DM) (Father, Mother)    FH: CAD (coronary artery disease) (Mother)      Allergies    No Known Allergies    Intolerances        HOME MEDICATIONS:    REVIEW OF SYSTEMS:  [x ] Unable to assess ROS 2/2 AMS    OBJECTIVE:  ICU Vital Signs Last 24 Hrs  T(C): 38.3 (14 Mar 2024 07:41), Max: 38.8 (14 Mar 2024 03:35)  T(F): 101 (14 Mar 2024 07:41), Max: 101.8 (14 Mar 2024 03:35)  HR: 144 (14 Mar 2024 07:56) (90 - 180)  BP: 107/77 (14 Mar 2024 07:56) (107/77 - 169/71)  BP(mean): --  ABP: --  ABP(mean): --  RR: 28 (14 Mar 2024 07:56) (20 - 28)  SpO2: 94% (14 Mar 2024 07:56) (87% - 98%)    O2 Parameters below as of 14 Mar 2024 07:41  Patient On (Oxygen Delivery Method): nasal cannula  O2 Flow (L/min): 3            03-13 @ 07:01  -  03-14 @ 07:00  --------------------------------------------------------  IN: 0 mL / OUT: 1775 mL / NET: -1775 mL      CAPILLARY BLOOD GLUCOSE      POCT Blood Glucose.: 345 mg/dL (14 Mar 2024 08:00)      PHYSICAL EXAM:  GENERAL: NAD, lying in bed appears lethargic  HEAD:  Atraumatic, normocephalic  EYES: EOMI, PERRLA, conjunctiva and sclera clear  NECK: Supple, trachea midline, no JVD  HEART: tachycardic  LUNGS: Unlabored respirations.  Clear to auscultation bilaterally  ABDOMEN: abdominal distension, nontender  EXTREMITIES: No clubbing, cyanosis, or edema  NERVOUS SYSTEM: moving all extremities, no focal deficits     HOSPITAL MEDICATIONS:  MEDICATIONS  (STANDING):  aMIOdarone Infusion 0.5 mG/Min (16.7 mL/Hr) IV Continuous <Continuous>  aMIOdarone Infusion 1 mG/Min (33.3 mL/Hr) IV Continuous <Continuous>  atorvastatin 40 milliGRAM(s) Oral at bedtime  ceFAZolin   IVPB      ceFAZolin   IVPB 2000 milliGRAM(s) IV Intermittent every 8 hours  chlorhexidine 2% Cloths 1 Application(s) Topical <User Schedule>  enoxaparin Injectable 110 milliGRAM(s) SubCutaneous every 12 hours  gabapentin 100 milliGRAM(s) Oral every 8 hours  insulin glargine Injectable (LANTUS) 26 Unit(s) SubCutaneous at bedtime  insulin lispro (ADMELOG) corrective regimen sliding scale   SubCutaneous at bedtime  insulin lispro (ADMELOG) corrective regimen sliding scale   SubCutaneous three times a day before meals  lactulose Retention Enema 200 Gram(s) Rectal daily  lactulose Syrup 30 Gram(s) Oral every 6 hours  lidocaine   4% Patch 1 Patch Transdermal daily  lisinopril 2.5 milliGRAM(s) Oral daily  magnesium citrate Oral Solution 296 milliLiter(s) Oral once    MEDICATIONS  (PRN):  acetaminophen     Tablet .. 975 milliGRAM(s) Oral every 6 hours PRN Temp greater or equal to 38C (100.4F), Mild Pain (1 - 3)  aluminum hydroxide/magnesium hydroxide/simethicone Suspension 30 milliLiter(s) Oral every 4 hours PRN Dyspepsia  bisacodyl 5 milliGRAM(s) Oral daily PRN Constipation  cyclobenzaprine 5 milliGRAM(s) Oral every 8 hours PRN Spasm  melatonin 3 milliGRAM(s) Oral at bedtime PRN Insomnia  ondansetron Injectable 4 milliGRAM(s) IV Push every 8 hours PRN Nausea and/or Vomiting      LABS:                        15.4   17.88 )-----------( 234      ( 14 Mar 2024 04:20 )             43.2     03-14    136  |  103  |  60<H>  ----------------------------<  276<H>  4.2   |  22  |  1.17    Ca    9.1      14 Mar 2024 04:20  Phos  3.2     03-14  Mg     2.8     03-14    TPro  7.4  /  Alb  2.4<L>  /  TBili  0.5  /  DBili  x   /  AST  35  /  ALT  60  /  AlkPhos  136<H>  03-13    PT/INR - ( 12 Mar 2024 12:15 )   PT: 13.7 sec;   INR: 1.16 ratio         PTT - ( 12 Mar 2024 12:15 )  PTT:27.3 sec  Urinalysis Basic - ( 14 Mar 2024 04:20 )    Color: x / Appearance: x / SG: x / pH: x  Gluc: 276 mg/dL / Ketone: x  / Bili: x / Urobili: x   Blood: x / Protein: x / Nitrite: x   Leuk Esterase: x / RBC: x / WBC x   Sq Epi: x / Non Sq Epi: x / Bacteria: x      Arterial Blood Gas:  03-14 @ 09:58  7.39/30/77/18/95.9/-5.6  ABG lactate: --  Arterial Blood Gas:  03-14 @ 04:12  7.47/30/80/22/98.0/-0.9  ABG lactate: --    Venous Blood Gas:  03-12 @ 15:44  7.35/30/47/17/75.7  VBG Lactate: 2.80  Venous Blood Gas:  03-12 @ 13:55  7.32/29/44/15/75.3  VBG Lactate: 4.10      MICROBIOLOGY:     RADIOLOGY:  [x ] Reviewed and interpreted by me    EKG: CHIEF COMPLAINT: AMS    HPI:  58M w/ hx morbid obese (BMI 39) diabetes, HTN presenting with hyperglycemia. Daughter reports for past 2w pt has not been acting himself, generalized weakness. Yesterday fell a couple times after which he had difficulty moving RLE. Poor appetite. +abd pain diffusely, none currently No fever, cp, sob, n/v. Hx alcohol used but stopped 2yr ago.  Pt endorses pain in his lower back and his shoulder which isn't new but worse than normal. Pt endorses passing gas but is constipated last BM was 3 days ago. Says he was taking oxycodone and that might be the cause. Does endorse a generalized abdominal pain due to distension. In ED pt originally with afib. s/p cardizem now sinus.  (12 Mar 2024 22:11)    Brief Hospital Course:  Pt admitted to medicine service with sepsis, hyperglycemic with +RVP for coronavirus and MSSA bacteremia of undetermined source. Hospital course c/b new onset a-fib w/ RVR. Pt with multiple RRTs yesterday evening and today for a-fib with RVR not responding to IVP metoprolol. Today started on Cardizem gtt c/b hypotension. Additionally with persistent AMS and lethargy,  RRT called. ICU consult called for evaluation of persistent a-fib.     Subjective:  Pt seen and examined at bedside. Pt laying in bed appears lethargic moving all extremities. Pt started on amio drip at while at bedside s/p amio bolus. Systolic >110s HR 150s. POCUS preformed at bedside.         PAST MEDICAL & SURGICAL HISTORY:  DM (diabetes mellitus)      Morbidly obese      Other and unspecified ventral hernia with obstruction, without gangrene      Morbidly obese      H/O ventral hernia repair  2006          FAMILY HISTORY:  Family history of diabetes mellitus (DM) (Father, Mother)    FH: CAD (coronary artery disease) (Mother)      Allergies    No Known Allergies    Intolerances        HOME MEDICATIONS:    REVIEW OF SYSTEMS:  [x ] Unable to assess ROS 2/2 AMS    OBJECTIVE:  ICU Vital Signs Last 24 Hrs  T(C): 38.3 (14 Mar 2024 07:41), Max: 38.8 (14 Mar 2024 03:35)  T(F): 101 (14 Mar 2024 07:41), Max: 101.8 (14 Mar 2024 03:35)  HR: 144 (14 Mar 2024 07:56) (90 - 180)  BP: 107/77 (14 Mar 2024 07:56) (107/77 - 169/71)  BP(mean): --  ABP: --  ABP(mean): --  RR: 28 (14 Mar 2024 07:56) (20 - 28)  SpO2: 94% (14 Mar 2024 07:56) (87% - 98%)    O2 Parameters below as of 14 Mar 2024 07:41  Patient On (Oxygen Delivery Method): nasal cannula  O2 Flow (L/min): 3            03-13 @ 07:01  -  03-14 @ 07:00  --------------------------------------------------------  IN: 0 mL / OUT: 1775 mL / NET: -1775 mL      CAPILLARY BLOOD GLUCOSE      POCT Blood Glucose.: 345 mg/dL (14 Mar 2024 08:00)      PHYSICAL EXAM:  GENERAL: NAD, lying in bed appears lethargic  HEAD:  Atraumatic, normocephalic  EYES: EOMI, PERRLA, conjunctiva and sclera clear  NECK: Supple, trachea midline, no JVD  HEART: tachycardic  LUNGS: Unlabored respirations.  Clear to auscultation bilaterally  ABDOMEN: abdominal distension, nontender  EXTREMITIES: No clubbing, cyanosis, or edema  NERVOUS SYSTEM: moving all extremities, no focal deficits     HOSPITAL MEDICATIONS:  MEDICATIONS  (STANDING):  aMIOdarone Infusion 0.5 mG/Min (16.7 mL/Hr) IV Continuous <Continuous>  aMIOdarone Infusion 1 mG/Min (33.3 mL/Hr) IV Continuous <Continuous>  atorvastatin 40 milliGRAM(s) Oral at bedtime  ceFAZolin   IVPB      ceFAZolin   IVPB 2000 milliGRAM(s) IV Intermittent every 8 hours  chlorhexidine 2% Cloths 1 Application(s) Topical <User Schedule>  enoxaparin Injectable 110 milliGRAM(s) SubCutaneous every 12 hours  gabapentin 100 milliGRAM(s) Oral every 8 hours  insulin glargine Injectable (LANTUS) 26 Unit(s) SubCutaneous at bedtime  insulin lispro (ADMELOG) corrective regimen sliding scale   SubCutaneous at bedtime  insulin lispro (ADMELOG) corrective regimen sliding scale   SubCutaneous three times a day before meals  lactulose Retention Enema 200 Gram(s) Rectal daily  lactulose Syrup 30 Gram(s) Oral every 6 hours  lidocaine   4% Patch 1 Patch Transdermal daily  lisinopril 2.5 milliGRAM(s) Oral daily  magnesium citrate Oral Solution 296 milliLiter(s) Oral once    MEDICATIONS  (PRN):  acetaminophen     Tablet .. 975 milliGRAM(s) Oral every 6 hours PRN Temp greater or equal to 38C (100.4F), Mild Pain (1 - 3)  aluminum hydroxide/magnesium hydroxide/simethicone Suspension 30 milliLiter(s) Oral every 4 hours PRN Dyspepsia  bisacodyl 5 milliGRAM(s) Oral daily PRN Constipation  cyclobenzaprine 5 milliGRAM(s) Oral every 8 hours PRN Spasm  melatonin 3 milliGRAM(s) Oral at bedtime PRN Insomnia  ondansetron Injectable 4 milliGRAM(s) IV Push every 8 hours PRN Nausea and/or Vomiting      LABS:                        15.4   17.88 )-----------( 234      ( 14 Mar 2024 04:20 )             43.2     03-14    136  |  103  |  60<H>  ----------------------------<  276<H>  4.2   |  22  |  1.17    Ca    9.1      14 Mar 2024 04:20  Phos  3.2     03-14  Mg     2.8     03-14    TPro  7.4  /  Alb  2.4<L>  /  TBili  0.5  /  DBili  x   /  AST  35  /  ALT  60  /  AlkPhos  136<H>  03-13    PT/INR - ( 12 Mar 2024 12:15 )   PT: 13.7 sec;   INR: 1.16 ratio         PTT - ( 12 Mar 2024 12:15 )  PTT:27.3 sec  Urinalysis Basic - ( 14 Mar 2024 04:20 )    Color: x / Appearance: x / SG: x / pH: x  Gluc: 276 mg/dL / Ketone: x  / Bili: x / Urobili: x   Blood: x / Protein: x / Nitrite: x   Leuk Esterase: x / RBC: x / WBC x   Sq Epi: x / Non Sq Epi: x / Bacteria: x      Arterial Blood Gas:  03-14 @ 09:58  7.39/30/77/18/95.9/-5.6  ABG lactate: --  Arterial Blood Gas:  03-14 @ 04:12  7.47/30/80/22/98.0/-0.9  ABG lactate: --    Venous Blood Gas:  03-12 @ 15:44  7.35/30/47/17/75.7  VBG Lactate: 2.80  Venous Blood Gas:  03-12 @ 13:55  7.32/29/44/15/75.3  VBG Lactate: 4.10      MICROBIOLOGY:     RADIOLOGY:  [x ] Reviewed and interpreted by me    EKG:

## 2024-03-14 NOTE — DIETITIAN INITIAL EVALUATION ADULT - PERTINENT LABORATORY DATA
03-14    133<L>  |  101  |  73<H>  ----------------------------<  438<H>  4.8   |  18<L>  |  1.50<H>    Ca    8.5      14 Mar 2024 10:49  Phos  4.5     03-14  Mg     3.0     03-14    TPro  6.9  /  Alb  1.9<L>  /  TBili  0.5  /  DBili  x   /  AST  46<H>  /  ALT  55  /  AlkPhos  128<H>  03-14  POCT Blood Glucose.: 333 mg/dL (03-14-24 @ 11:28)  A1C with Estimated Average Glucose Result: 9.3 % (03-13-24 @ 07:09)

## 2024-03-14 NOTE — PROGRESS NOTE ADULT - SUBJECTIVE AND OBJECTIVE BOX
Cardiology Progress Note    Interval Events:  This AM pt went back into afib with RVR and then aflutter 150s not responding to AVN blockers or tylenol.  BP relatively low  rigors + episodic jerking and some decreased mentation      MEDICATIONS:  aMIOdarone Infusion 1 mG/Min IV Continuous <Continuous>  aMIOdarone Infusion 0.5 mG/Min IV Continuous <Continuous>  enoxaparin Injectable 110 milliGRAM(s) SubCutaneous every 12 hours  lisinopril 2.5 milliGRAM(s) Oral daily  ceFAZolin   IVPB 2000 milliGRAM(s) IV Intermittent every 8 hours  ceFAZolin   IVPB      ondansetron Injectable 4 milliGRAM(s) IV Push every 8 hours PRN  bisacodyl 5 milliGRAM(s) Oral daily PRN  lactulose Syrup 30 Gram(s) Oral every 6 hours  insulin glargine Injectable (LANTUS) 26 Unit(s) SubCutaneous at bedtime  insulin lispro (ADMELOG) corrective regimen sliding scale   SubCutaneous every 6 hours  chlorhexidine 2% Cloths 1 Application(s) Topical <User Schedule>    PHYSICAL EXAM:  T(C): 38.6 (03-14-24 @ 12:00), Max: 38.8 (03-14-24 @ 03:35)  HR: 138 (03-14-24 @ 12:00) (90 - 180)  BP: 119/75 (03-14-24 @ 12:00) (98/86 - 169/71)  RR: 28 (03-14-24 @ 12:00) (20 - 37)  SpO2: 94% (03-14-24 @ 11:30) (87% - 98%)  Wt(kg): --  I&O's Summary    13 Mar 2024 07:01  -  14 Mar 2024 07:00  --------------------------------------------------------  IN: 0 mL / OUT: 1775 mL / NET: -1775 mL    14 Mar 2024 07:01  -  14 Mar 2024 12:35  --------------------------------------------------------  IN: 133.2 mL / OUT: 0 mL / NET: 133.2 mL    Appearance: ill appearing, no acute distress  HEENT:   mmm  Cardiovascular: Normal S1 S2, no elevated JVP, no murmurs, trace edema  Respiratory: Lungs clear to auscultation, poor air movement, dyspneic  Psychiatry: calm  Gastrointestinal:  distended, nt to light palpation  Skin: no cyanosis	  Vascular: Peripheral pulses palpable bilaterally    LABS:	 	  CBC Full  -  ( 14 Mar 2024 04:20 )  WBC Count : 17.88 K/uL  Hemoglobin : 15.4 g/dL  Hematocrit : 43.2 %  Platelet Count - Automated : 234 K/uL    03-14  133<L>  |  101  |  73<H>  ----------------------------<  438<H>  4.8   |  18<L>  |  1.50<H>    03-14  136  |  103  |  60<H>  ----------------------------<  276<H>  4.2   |  22  |  1.17    Ca    8.5      14 Mar 2024 10:49  Ca    9.1      14 Mar 2024 04:20  Phos  4.5     03-14  Phos  3.2     03-14  Mg     3.0     03-14  Mg     2.8     03-14    TPro  6.9  /  Alb  1.9<L>  /  TBili  0.5  /  DBili  x   /  AST  46<H>  /  ALT  55  /  AlkPhos  128<H>  03-14  TPro  7.4  /  Alb  2.4<L>  /  TBili  0.5  /  DBili  x   /  AST  35  /  ALT  60  /  AlkPhos  136<H>  03-13    proBNP:   Lipid Profile:   HgA1c:   TSH: Thyroid Stimulating Hormone, Serum: 0.105 uU/mL (03-14 @ 04:20)    CARDIAC MARKERS:    TELEMETRY: 	Afib, aflutter    ECG:  	as above  RADIOLOGY:  OTHER: 	    PREVIOUS DIAGNOSTIC TESTING:    [x] Echocardiogram: < from: TTE Echo Complete w/o Contrast w/ Doppler (03.13.24 @ 15:41) >  Summary:   1. Technically limited study.   2. Left ventricle not well visualized, grossly normal systolic function.   3. Normal right ventricular size and function.   4. The left atrium is normal in size.   5. Mild mitral annular calcification.   6. Normal trileaflet aortic valve with normal opening.    < end of copied text >    [ ] Catheterization:  [ ] Stress Test:

## 2024-03-14 NOTE — DIETITIAN INITIAL EVALUATION ADULT - OTHER INFO
pt s/p RRT x 2 this morning with transfer to ICU; pt lethargic and being cared for by RN s/p transfer at time of visit.  Per H&P pt with poor appetite PTA. Pt with T2DM; glimepiride PTA as per H&P. HbA1c 9.3% indicates poor blood glucose management. pt s/p RRT x 2 this morning with transfer to CCU; pt lethargic and being cared for by RN s/p transfer at time of visit.  Per H&P pt with poor appetite PTA. Pt with T2DM; glimepiride PTA as per H&P. HbA1c 9.3% indicates poor blood glucose management.  Will attempt nutrition education on follow up as per protocol as pt lethargic s/p RRT and transfer to CCU. RD remains available.

## 2024-03-14 NOTE — DIETITIAN INITIAL EVALUATION ADULT - REASON INDICATOR FOR ASSESSMENT
Pt seen for nutrition consult x 2 for MST score 2 or greater and for education/assessment + elevated HbA1c 9.3% + CCU admission

## 2024-03-14 NOTE — PROGRESS NOTE ADULT - ASSESSMENT
58M HTN, DM who presented with malaise, found to be septic, hyperglycemic with +RVP for coronavirus and MSSA bacteremia of undetermined source.    pAF now sustaining with elevated rates and relative hypotension  started amio gtt for better control as the pt is showing evidence of severe sepsis as well as abdominal breathing. escalating diltiazem may lead to worsening hypotension.  checking lactate  abdominal eval  cont AC  if remains in flutter and is hypotensive will need to consider emergent DCCV  transfer to ICU as the pt is at risk of acute decompensation.

## 2024-03-14 NOTE — EEG REPORT - NS EEG TEXT BOX
DANY DUEÑAS [id number]     Study Date: 03-14-24  Duration: 21 minutes    --------------------------------------------------------------------------------------------------  History:  CC/ HPI Patient is a 58y old  Male who presents with a chief complaint of FEVER     (14 Mar 2024 12:14)    MEDICATIONS  (STANDING):  aMIOdarone Infusion 1 mG/Min (33.3 mL/Hr) IV Continuous <Continuous>  aMIOdarone Infusion 0.5 mG/Min (16.7 mL/Hr) IV Continuous <Continuous>     ceFAZolin   IVPB 2000 milliGRAM(s) IV Intermittent every 8 hours  chlorhexidine 2% Cloths 1 Application(s) Topical <User Schedule>  enoxaparin Injectable 110 milliGRAM(s) SubCutaneous every 12 hours  lactulose Syrup 30 Gram(s) Oral every 6 hours  lisinopril 2.5 milliGRAM(s) Oral daily    --------------------------------------------------------------------------------------------------  Study Interpretation:    [Abbreviation Key:  PDR=alpha rhythm/posterior dominant rhythm. A-P=anterior posterior.  Amplitude: ‘very low’:<20; ‘low’:20-49; ‘medium’:; ‘high’:>150uV.  Persistence for periodic/rhythmic patterns (% of epoch) ‘rare’:<1%; ‘occasional’:1-10%; ‘frequent’:10-50%; ‘abundant’:50-90%; ‘continuous’:>90%.  Persistence for sporadic discharges: ‘rare’:<1/hr; ‘occasional’:1/min-1/hr; ‘frequent’:>1/min; ‘abundant’:>1/10 sec.  RPP=rhythmic and periodic patterns; GRDA=generalized rhythmic delta activity; FIRDA=frontal intermittent GRDA; LRDA=lateralized rhythmic delta activity; TIRDA=temporal intermittent rhythmic delta activity;  LPD=PLED=lateralized periodic discharges; GPD=generalized periodic discharges; BIPDs =bilateral independent periodic discharges; Mf=multifocal; SIRPDs=stimulus induced rhythmic, periodic, or ictal appearing discharges; BIRDs=brief potentially ictal rhythmic discharges >4 Hz, lasting .5-10s; PFA (paroxysmal bursts >13 Hz or =8 Hz <10s).  Modifiers: +F=with fast component; +S=with spike component; +R=with rhythmic component.  S-B=burst suppression pattern.  Max=maximal. N1-drowsy; N2-stage II sleep; N3-slow wave sleep. SSS/BETS=small sharp spikes/benign epileptiform transients of sleep. HV=hyperventilation; PS=photic stimulation]    FINDINGS:      Background:  Continuity: continuous  Symmetry: symmetric  PDR: 7.5 Hz activity, with amplitude to 25 uV, that attenuated to eye opening.    Reactivity: present  Voltage: normal (between 20-150uV)  Anterior Posterior Gradient: present  Other background findings: none  Breach: absent    Background Slowing:  Generalized slowing: Intermittent diffuse irregular delta and theta activity.  Focal slowing: none was present.    State Changes:   -N2 sleep transients were not recorded.    Sporadic Epileptiform Discharges:    None    Rhythmic and Periodic Patterns (RPPs):  None     Electrographic and Electroclinical seizures:  None    Other Clinical Events:  None    Activation Procedures:   -Photic stimulation was performed and did not elicit any abnormalities.    -Hyperventilation was not performed.       Artifacts:  Intermittent myogenic and movement artifacts were noted.    ECG:  The heart rate on single channel ECG was predominantly between 140-160 BPM.    EEG Classification / Summary:  Abnormal EEG study  Mild to moderate generalized background slowing  ECG between 140-160 BPM.  -----------------------------------------------------------------------------------------------------    Clinical Impression: ***THIS IS A PRELIMINARY FELLOW REPORT PENDING REVIEW WITH ATTENDING EPILEPTOLOGIST***  Mild to moderate diffuse/multi-focal cerebral dysfunction, not specific as to etiology.  There were no epileptiform abnormalities recorded.    ECG between 140-160 BPM.  Irineo Dalton MD  PGY-6, Pediatric Epilepsy Fellow   -------------------------------------------------------------------------------------------------------  [facility] EEG Reading Room Ph#: [phone]  Epilepsy Answering Service after 5PM and before 8:30AM: Ph#: [phone]   DANY DUEÑAS [id number]     Study Date: 03-14-24  Duration: 21 minutes    --------------------------------------------------------------------------------------------------  History:  CC/ HPI Patient is a 58y old  Male who presents with a chief complaint of FEVER     (14 Mar 2024 12:14)    MEDICATIONS  (STANDING):  aMIOdarone Infusion 1 mG/Min (33.3 mL/Hr) IV Continuous <Continuous>  aMIOdarone Infusion 0.5 mG/Min (16.7 mL/Hr) IV Continuous <Continuous>     ceFAZolin   IVPB 2000 milliGRAM(s) IV Intermittent every 8 hours  chlorhexidine 2% Cloths 1 Application(s) Topical <User Schedule>  enoxaparin Injectable 110 milliGRAM(s) SubCutaneous every 12 hours  lactulose Syrup 30 Gram(s) Oral every 6 hours  lisinopril 2.5 milliGRAM(s) Oral daily    --------------------------------------------------------------------------------------------------  Study Interpretation:    [Abbreviation Key:  PDR=alpha rhythm/posterior dominant rhythm. A-P=anterior posterior.  Amplitude: ‘very low’:<20; ‘low’:20-49; ‘medium’:; ‘high’:>150uV.  Persistence for periodic/rhythmic patterns (% of epoch) ‘rare’:<1%; ‘occasional’:1-10%; ‘frequent’:10-50%; ‘abundant’:50-90%; ‘continuous’:>90%.  Persistence for sporadic discharges: ‘rare’:<1/hr; ‘occasional’:1/min-1/hr; ‘frequent’:>1/min; ‘abundant’:>1/10 sec.  RPP=rhythmic and periodic patterns; GRDA=generalized rhythmic delta activity; FIRDA=frontal intermittent GRDA; LRDA=lateralized rhythmic delta activity; TIRDA=temporal intermittent rhythmic delta activity;  LPD=PLED=lateralized periodic discharges; GPD=generalized periodic discharges; BIPDs =bilateral independent periodic discharges; Mf=multifocal; SIRPDs=stimulus induced rhythmic, periodic, or ictal appearing discharges; BIRDs=brief potentially ictal rhythmic discharges >4 Hz, lasting .5-10s; PFA (paroxysmal bursts >13 Hz or =8 Hz <10s).  Modifiers: +F=with fast component; +S=with spike component; +R=with rhythmic component.  S-B=burst suppression pattern.  Max=maximal. N1-drowsy; N2-stage II sleep; N3-slow wave sleep. SSS/BETS=small sharp spikes/benign epileptiform transients of sleep. HV=hyperventilation; PS=photic stimulation]    FINDINGS:      Background:  The background is continuous and symmetric. The predominant background in the most wakeful state consists of diffuse polymorphic delta slowing with overriding alpha frequencies and intermittent theta. There is no posterior dominant rhythm.     Background Slowing:  Generalized slowing: as above  Focal slowing: none was present.    State Changes:   Drowsiness is characterized by slowing of the background activity.  No clear normal stage 2 sleep is captured.    Sporadic Epileptiform Discharges:    None    Rhythmic and Periodic Patterns (RPPs):  None     Electrographic and Electroclinical seizures:  None    Other Clinical Events:  None    Activation Procedures:   -Photic stimulation was performed and did not elicit any abnormalities.    -Hyperventilation was not performed.       Artifacts:  Intermittent myogenic and movement artifacts and loose electrodes were present.    Single-lead EKG: Regular rhythm at 140-150 bpm.      -----------------------------------------------------------------------------------------------------    EEG Classification / Summary:  Abnormal routine EEG in the awake and drowsy states.  Mild-moderate generalized background slowing.  No focal or epileptiform abnormalities captured.    Clinical Impression:  Mild-moderate diffuse/multi-focal cerebral dysfunction, not specific as to etiology.  There were no epileptiform abnormalities recorded.    Single-lead EKG: Regular rhythm at 140-150 bpm.     -------------------------------------------------------------------------------------------------------  Irineo Dalton MD  PGY-6, Pediatric Epilepsy Fellow    Joy Henning MD  Attending Physician, [facility]     [facility] EEG Reading Room Ph#: [phone]  Epilepsy Answering Service after 5PM and before 8:30AM: Ph#: [phone]

## 2024-03-14 NOTE — PROGRESS NOTE ADULT - PROBLEM SELECTOR PLAN 1
Continue with the current  regimen while inpatient   may add prandial lispro low dose   Metformin relatively contraindicated   while inpatient, finger sticks should be 100-180   once infection decreases expect better blood glucose
blood cx - MSSA   ID consulted   f/u lactate ensure resolution of elevation   get ECHO   obtain repeat blood cx  continue with antibx for now - can be managed  by

## 2024-03-15 NOTE — PROVIDER CONTACT NOTE (EICU) - ACTION/TREATMENT ORDERED:
Orders placed as requested for Bicarb gtt, Nimbex gtt, Fentanyl gtt, Fentanyl 50 mcg x 1, Nimbex 20mg IVP x 11, Hydrocortisone 100mg x 1, Calcium 2gm x 1 and Bicarb x 2. Further plan as per primary team.

## 2024-03-15 NOTE — PROGRESS NOTE ADULT - SUBJECTIVE AND OBJECTIVE BOX
[facility]  INFECTIOUS DISEASES   [address] [place] [address]  Tel: [phone]     Fax: [phone]  ==============================================================================  MD Salomon Allen, DO Johanna Martinez, NP   ==============================================================================      DANY DUEÑAS  MRN-[id number]  58y ([birth year])      Interval History:    ROS:    [ ] Unobtainable because:  [ ] All other systems negative except as noted    Constitutional: no fever, no chills  Head: no trauma  Eyes: no vision changes, no eye pain  ENT:  no sore throat, no rhinorrhea  Cardiovascular:  no chest pain, no palpitation  Respiratory:  no SOB, no cough  GI:  no abd pain, no vomiting, no diarrhea  urinary: no dysuria, no hematuria, no flank pain  musculoskeletal:  no joint pain, no joint swelling  skin:  no rash  neurology:  no headache, no seizure, no change in mental status  psych: no anxiety, no depression         Allergies  No Known Allergies        ANTIMICROBIALS:  ceFAZolin   IVPB    ceFAZolin   IVPB 2000 every 8 hours        Physical Exam:  Vital Signs Last 24 Hrs  T(C): 37.1 (15 Mar 2024 08:00), Max: 38.8 (14 Mar 2024 15:00)  T(F): 98.7 (15 Mar 2024 08:00), Max: 101.9 (14 Mar 2024 15:00)  HR: 98 (15 Mar 2024 11:16) (0 - 164)  BP: 108/31 (15 Mar 2024 11:00) (62/48 - 185/169)  BP(mean): 43 (15 Mar 2024 11:00) (43 - 176)  RR: 32 (15 Mar 2024 11:00) (0 - 39)  SpO2: 81% (15 Mar 2024 08:40) (80% - 100%)    Parameters below as of 15 Mar 2024 11:00  Patient On (Oxygen Delivery Method): ventilator    O2 Concentration (%): 100    03-14-24 @ 07:01  -  03-15-24 @ 07:00  --------------------------------------------------------  IN: 2141.6 mL / OUT: 3010 mL / NET: -868.4 mL    03-15-24 @ 07:01  -  03-15-24 @ 11:45  --------------------------------------------------------  IN: 2000 mL / OUT: 0 mL / NET: 2000 mL      General:    NAD,  non toxic  Head: atraumatic, normocephalic  Eye: normal sclera and conjunctiva  ENT:    no oral lesions, neck supple  Cardio:     regular S1, S2,  no murmur  Respiratory:    clear b/l,    no wheezing  abd:     soft,   BS +,   no tenderness  :   no CVAT,  no suprapubic tenderness,   no  graham  Musculoskeletal:   no joint swelling,   no edema  vascular: no central lines, +PIV   Skin:    no rash  Neurologic:     no focal deficit  psych: normal affect    WBC Count: 18.54 K/uL (03-15 @ 08:21)  WBC Count: 21.99 K/uL (03-15 @ 03:15)  WBC Count: 17.05 K/uL (03-14 @ 13:00)  WBC Count: 17.88 K/uL (03-14 @ 04:20)  WBC Count: 18.13 K/uL (03-13 @ 07:09)  WBC Count: 21.20 K/uL (03-12 @ 12:15)                            14.1   18.54 )-----------( 101      ( 15 Mar 2024 08:21 )             44.3       03-15    147<H>  |  107  |  91<H>  ----------------------------<  148<H>  7.4<HH>   |  18<L>  |  2.89<H>    Ca    11.7<H>      15 Mar 2024 08:21  Phos  >18.0     03-15  Mg     4.3     03-15    TPro  5.8<L>  /  Alb  1.4<L>  /  TBili  1.0  /  DBili  x   /  AST  04250<H>  /  ALT  5506<H>  /  AlkPhos  372<H>  03-15      Urinalysis Basic - ( 15 Mar 2024 08:21 )    Color: x / Appearance: x / SG: x / pH: x  Gluc: 148 mg/dL / Ketone: x  / Bili: x / Urobili: x   Blood: x / Protein: x / Nitrite: x   Leuk Esterase: x / RBC: x / WBC x   Sq Epi: x / Non Sq Epi: x / Bacteria: x          Creatinine Trend: 2.89<--, 2.12<--, 1.50<--, 1.17<--, 0.92<--, 1.11<--  Lactate, Blood: 14.5 mmol/L (03-15-24 @ 08:21)  Blood Gas Arterial, Lactate: 3.40 mmol/L (03-15-24 @ 02:42)  Lactate, Blood: 1.9 mmol/L (03-14-24 @ 10:39)  Lactate, Blood: 1.9 mmol/L (03-14-24 @ 04:20)      MICROBIOLOGY:  v  .Blood Blood-Peripheral  03-14-24   Growth in anaerobic bottle: Gram Positive Cocci in Clusters  Growth in aerobic bottle: Gram Positive Cocci in Clusters  --    Growth in anaerobic bottle: Gram Positive Cocci in Clusters  Growth in aerobic bottle: Gram Positive Cocci in Clusters      .Blood Blood  03-14-24   Growth in anaerobic bottle: Gram Positive Cocci in Clusters  Growth in aerobic bottle: Gram Positive Cocci in Clusters  --    Growth in anaerobic bottle: Gram Positive Cocci in Clusters  Growth in aerobic bottle: Gram Positive Cocci in Clusters      .Blood Blood  03-14-24   Growth in anaerobic bottle: Gram Positive Cocci in Clusters  Growth in aerobic bottle: Gram Positive Cocci in Clusters  --    Growth in anaerobic bottle: Gram Positive Cocci in Clusters  Growth in aerobic bottle: Gram Positive Cocci in Clusters      Clean Catch Clean Catch (Midstream)  03-12-24   No growth  --  --      .Blood Blood-Peripheral  03-12-24   Growth in aerobic and anaerobic bottles: Staphylococcus aureus  Direct identification is available within approximately 3-5  hours either by Blood Panel Multiplexed PCR or Direct  MALDI-TOF. Details: [email or web address]  --  Blood Culture PCR  Staphylococcus aureus            Rapid RVP Result: Detected (03-12 @ 18:21)                Procalcitonin, Serum: 2.74 (03-14-24 @ 10:39)    SARS-CoV-2: NotDetec (03-12-24 @ 18:21)  Rapid RVP Result: Detected (03-12-24 @ 18:21)        RADIOLOGY:   [facility]  INFECTIOUS DISEASES   [address] [place] [address]  Tel: [phone]     Fax: [phone]  ==============================================================================  MD Salomon Allen, DO Johanna Martinez, NP   ==============================================================================      DANY DUEÑAS  MRN-[id number]  58y ([birth year])      Interval History:  Patient seen and examined in CCU today.  patient's family at bedside.  patient s/p RRT last night and cardiac arrest  and now is intubated, sedated , and on bicarp drip and vasopressors    Noted to have voluminous NGT output now s/p Cardiac arrest from massive aspiration,  Now in multi-organ failure/shock. Severe met acidosis.      ROS:    [ ] Unobtainable because:  [ ] All other systems negative except as noted    Constitutional: no fever, no chills  Head: no trauma  Eyes: no vision changes, no eye pain  ENT:  no sore throat, no rhinorrhea  Cardiovascular:  no chest pain, no palpitation  Respiratory:  no SOB, no cough  GI:  no abd pain, no vomiting, no diarrhea  urinary: no dysuria, no hematuria, no flank pain  musculoskeletal:  no joint pain, no joint swelling  skin:  no rash  neurology:  no headache, no seizure, no change in mental status  psych: no anxiety, no depression         Allergies  No Known Allergies        ANTIMICROBIALS:  ceFAZolin   IVPB    ceFAZolin   IVPB 2000 every 8 hours        Physical Exam:  Vital Signs Last 24 Hrs  T(C): 37.1 (15 Mar 2024 08:00), Max: 38.8 (14 Mar 2024 15:00)  T(F): 98.7 (15 Mar 2024 08:00), Max: 101.9 (14 Mar 2024 15:00)  HR: 98 (15 Mar 2024 11:16) (0 - 164)  BP: 108/31 (15 Mar 2024 11:00) (62/48 - 185/169)  BP(mean): 43 (15 Mar 2024 11:00) (43 - 176)  RR: 32 (15 Mar 2024 11:00) (0 - 39)  SpO2: 81% (15 Mar 2024 08:40) (80% - 100%)    Parameters below as of 15 Mar 2024 11:00  Patient On (Oxygen Delivery Method): ventilator    O2 Concentration (%): 100    03-14-24 @ 07:01  -  03-15-24 @ 07:00  --------------------------------------------------------  IN: 2141.6 mL / OUT: 3010 mL / NET: -868.4 mL    03-15-24 @ 07:01  -  03-15-24 @ 11:45  --------------------------------------------------------  IN: 2000 mL / OUT: 0 mL / NET: 2000 mL      General:   obtunded,,not reponsive, intubated  Head: atraumatic, normocephalic  Eyes: normal sclera and conjunctiva  ENT:   ET and NGT present  Cardio:   tachycardic  Respiratory:   on the vent , decreased breath sounds bibasilar  abd:   distended, firm,   :     + graham  Musculoskeletal : no joint swelling, no edema  Skin:    no rash  Neurologic:  nonresponsive, is also sedated at this time as per ICU team        WBC Count: 18.54 K/uL (03-15 @ 08:21)  WBC Count: 21.99 K/uL (03-15 @ 03:15)  WBC Count: 17.05 K/uL (03-14 @ 13:00)  WBC Count: 17.88 K/uL (03-14 @ 04:20)  WBC Count: 18.13 K/uL (03-13 @ 07:09)  WBC Count: 21.20 K/uL (03-12 @ 12:15)                            14.1   18.54 )-----------( 101      ( 15 Mar 2024 08:21 )             44.3       03-15    147<H>  |  107  |  91<H>  ----------------------------<  148<H>  7.4<HH>   |  18<L>  |  2.89<H>    Ca    11.7<H>      15 Mar 2024 08:21  Phos  >18.0     03-15  Mg     4.3     03-15    TPro  5.8<L>  /  Alb  1.4<L>  /  TBili  1.0  /  DBili  x   /  AST  63196<H>  /  ALT  5506<H>  /  AlkPhos  372<H>  03-15      Urinalysis Basic - ( 15 Mar 2024 08:21 )    Color: x / Appearance: x / SG: x / pH: x  Gluc: 148 mg/dL / Ketone: x  / Bili: x / Urobili: x   Blood: x / Protein: x / Nitrite: x   Leuk Esterase: x / RBC: x / WBC x   Sq Epi: x / Non Sq Epi: x / Bacteria: x          Creatinine Trend: 2.89<--, 2.12<--, 1.50<--, 1.17<--, 0.92<--, 1.11<--  Lactate, Blood: 14.5 mmol/L (03-15-24 @ 08:21)  Blood Gas Arterial, Lactate: 3.40 mmol/L (03-15-24 @ 02:42)  Lactate, Blood: 1.9 mmol/L (03-14-24 @ 10:39)  Lactate, Blood: 1.9 mmol/L (03-14-24 @ 04:20)      MICROBIOLOGY:    .Blood Blood-Peripheral  03-14-24   Growth in anaerobic bottle: Gram Positive Cocci in Clusters  Growth in aerobic bottle: Gram Positive Cocci in Clusters  --    Growth in anaerobic bottle: Gram Positive Cocci in Clusters  Growth in aerobic bottle: Gram Positive Cocci in Clusters      .Blood Blood  03-14-24   Growth in anaerobic bottle: Gram Positive Cocci in Clusters  Growth in aerobic bottle: Gram Positive Cocci in Clusters  --    Growth in anaerobic bottle: Gram Positive Cocci in Clusters  Growth in aerobic bottle: Gram Positive Cocci in Clusters      .Blood Blood  03-14-24   Growth in anaerobic bottle: Gram Positive Cocci in Clusters  Growth in aerobic bottle: Gram Positive Cocci in Clusters  --    Growth in anaerobic bottle: Gram Positive Cocci in Clusters  Growth in aerobic bottle: Gram Positive Cocci in Clusters      Clean Catch Clean Catch (Midstream)  03-12-24   No growth  --  --      .Blood Blood-Peripheral  03-12-24   Growth in aerobic and anaerobic bottles: Staphylococcus aureus  Direct identification is available within approximately 3-5  hours either by Blood Panel Multiplexed PCR or Direct  MALDI-TOF. Details: [email or web address]  --  Blood Culture PCR  Staphylococcus aureus      Rapid RVP Result: Detected (03-12 @ 18:21)        Procalcitonin, Serum: 2.74 (03-14-24 @ 10:39)    SARS-CoV-2: NotDetec (03-12-24 @ 18:21)  Rapid RVP Result: Detected (03-12-24 @ 18:21)        RADIOLOGY:  < from: Xray Chest 1 View- PORTABLE-Urgent (Xray Chest 1 View- PORTABLE-Urgent .) (03.14.24 @ 10:44) >  ACC: [id number] EXAM:  XR NEON PORT CHEST ABD STAT 1V   ORDERED BY: ALEJANDRO MODI     ACC: [id number] EXAM:  XR NEON PORT CHEST ABD STAT 1V   ORDERED BY: ALEJANDRO MODI     ACC: [id number] EXAM:  XR CHEST PORTABLE URGENT 1V   ORDERED BY: BRANDIN MAYS     PROCEDURE DATE:  03/14/2024          INTERPRETATION:  INDICATION: NG tube    Portable chest 3/14/2024    COMPARISON: 3/12/2024    FINDINGS:  Heart/Vascular: The heart size, mediastinum, hilum and aorta are within   normal limits for projection.  Pulmonary: Midline trachea. There is no focal infiltrate, congestion or   effusion.  Bones: There is no fracture.  Lines and catheter: NG tube below diaphragm, tip beyond field-of-view    Portable chest 3/15/2024 at 5:10 AM    Overlying EKG leads, wires and other artifacts    FINDINGS: ET tube is high at level of clavicles. NG tube below diaphragm,   tip beyond field-of-view. Probable small left pleural effusion. Left   hilar and retrocardiac opacity/atelectasis. No pulmonary venous   congestion. No pneumothorax. Heart size and mediastinum stable    Portable chest 3/15/2024 at 7:03 AM    FINDINGS: Tip of right IJ venous catheter in distal SVC. ET tube remains   somewhat high in position. NG tube below diaphragm. Again seen is a   probable small left pleural effusion. Left perihilar and retrocardiac   opacity/atelectasis.    Impression:    ET tube slightly high in position consider advancing    Tip of NG tube not seen.    Tip of right IJ venous catheter in distal SVC. No pneumothorax    Again seen is a probable small left pleural effusion. Left perihilar and   retrocardiac opacity/atelectasis.    --- End of Report ---            DESTINEE BUENROSTRO DO; Attending Radiologist  This document has been electronically signed. Mar 15 2024 11:06AM    < end of copied text >     [facility]  INFECTIOUS DISEASES   [address] [place] [address]  Tel: [phone]     Fax: [phone]  ==============================================================================  MD Salomon Allen, DO Johanna Martinez, NP   ==============================================================================      DANY DUEÑAS  MRN-[id number]  58y ([birth year])      Interval History:  Patient seen and examined in CCU today.  patient's family at bedside.  patient s/p RRT last night and cardiac arrest  and now is intubated, sedated , and on bicarp drip and vasopressors    Noted to have voluminous NGT output now s/p Cardiac arrest from massive aspiration,  Now in multi-organ failure/shock. Severe met acidosis.      ROS:    [ ] Unobtainable because:  unable to obtain as patient is sedated and intubated and not responsive.         Allergies  No Known Drug  Allergies        ANTIMICROBIALS:  ceFAZolin   IVPB    ceFAZolin   IVPB 2000 every 8 hours        Physical Exam:  Vital Signs Last 24 Hrs  T(C): 37.1 (15 Mar 2024 08:00), Max: 38.8 (14 Mar 2024 15:00)  T(F): 98.7 (15 Mar 2024 08:00), Max: 101.9 (14 Mar 2024 15:00)  HR: 98 (15 Mar 2024 11:16) (0 - 164)  BP: 108/31 (15 Mar 2024 11:00) (62/48 - 185/169)  BP(mean): 43 (15 Mar 2024 11:00) (43 - 176)  RR: 32 (15 Mar 2024 11:00) (0 - 39)  SpO2: 81% (15 Mar 2024 08:40) (80% - 100%)    Parameters below as of 15 Mar 2024 11:00  Patient On (Oxygen Delivery Method): ventilator    O2 Concentration (%): 100    03-14-24 @ 07:01  -  03-15-24 @ 07:00  --------------------------------------------------------  IN: 2141.6 mL / OUT: 3010 mL / NET: -868.4 mL    03-15-24 @ 07:01  -  03-15-24 @ 11:45  --------------------------------------------------------  IN: 2000 mL / OUT: 0 mL / NET: 2000 mL      General:   obtunded,,not reponsive, intubated  Head: atraumatic, normocephalic  Eyes: normal sclera and conjunctiva  ENT:   ET and NGT present  Cardio:   tachycardic  Respiratory:   on the vent , decreased breath sounds bibasilar  abd:   distended, firm,   :     + graham  Musculoskeletal : no joint swelling, no edema  Skin:    no rash  Neurologic:  nonresponsive, is also sedated at this time as per ICU team        WBC Count: 18.54 K/uL (03-15 @ 08:21)  WBC Count: 21.99 K/uL (03-15 @ 03:15)  WBC Count: 17.05 K/uL (03-14 @ 13:00)  WBC Count: 17.88 K/uL (03-14 @ 04:20)  WBC Count: 18.13 K/uL (03-13 @ 07:09)  WBC Count: 21.20 K/uL (03-12 @ 12:15)                            14.1   18.54 )-----------( 101      ( 15 Mar 2024 08:21 )             44.3       03-15    147<H>  |  107  |  91<H>  ----------------------------<  148<H>  7.4<HH>   |  18<L>  |  2.89<H>    Ca    11.7<H>      15 Mar 2024 08:21  Phos  >18.0     03-15  Mg     4.3     03-15    TPro  5.8<L>  /  Alb  1.4<L>  /  TBili  1.0  /  DBili  x   /  AST  25827<H>  /  ALT  5506<H>  /  AlkPhos  372<H>  03-15      Urinalysis Basic - ( 15 Mar 2024 08:21 )    Color: x / Appearance: x / SG: x / pH: x  Gluc: 148 mg/dL / Ketone: x  / Bili: x / Urobili: x   Blood: x / Protein: x / Nitrite: x   Leuk Esterase: x / RBC: x / WBC x   Sq Epi: x / Non Sq Epi: x / Bacteria: x          Creatinine Trend: 2.89<--, 2.12<--, 1.50<--, 1.17<--, 0.92<--, 1.11<--  Lactate, Blood: 14.5 mmol/L (03-15-24 @ 08:21)  Blood Gas Arterial, Lactate: 3.40 mmol/L (03-15-24 @ 02:42)  Lactate, Blood: 1.9 mmol/L (03-14-24 @ 10:39)  Lactate, Blood: 1.9 mmol/L (03-14-24 @ 04:20)      MICROBIOLOGY:    .Blood Blood-Peripheral  03-14-24   Growth in anaerobic bottle: Gram Positive Cocci in Clusters  Growth in aerobic bottle: Gram Positive Cocci in Clusters  --    Growth in anaerobic bottle: Gram Positive Cocci in Clusters  Growth in aerobic bottle: Gram Positive Cocci in Clusters      .Blood Blood  03-14-24   Growth in anaerobic bottle: Gram Positive Cocci in Clusters  Growth in aerobic bottle: Gram Positive Cocci in Clusters  --    Growth in anaerobic bottle: Gram Positive Cocci in Clusters  Growth in aerobic bottle: Gram Positive Cocci in Clusters      .Blood Blood  03-14-24   Growth in anaerobic bottle: Gram Positive Cocci in Clusters  Growth in aerobic bottle: Gram Positive Cocci in Clusters  --    Growth in anaerobic bottle: Gram Positive Cocci in Clusters  Growth in aerobic bottle: Gram Positive Cocci in Clusters      Clean Catch Clean Catch (Midstream)  03-12-24   No growth  --  --      .Blood Blood-Peripheral  03-12-24   Growth in aerobic and anaerobic bottles: Staphylococcus aureus  Direct identification is available within approximately 3-5  hours either by Blood Panel Multiplexed PCR or Direct  MALDI-TOF. Details: [email or web address]  --  Blood Culture PCR  Staphylococcus aureus      Rapid RVP Result: Detected (03-12 @ 18:21)        Procalcitonin, Serum: 2.74 (03-14-24 @ 10:39)    SARS-CoV-2: NotDetec (03-12-24 @ 18:21)  Rapid RVP Result: Detected (03-12-24 @ 18:21)        RADIOLOGY:  < from: Xray Chest 1 View- PORTABLE-Urgent (Xray Chest 1 View- PORTABLE-Urgent .) (03.14.24 @ 10:44) >  ACC: [id number] EXAM:  XR NEON PORT CHEST ABD STAT 1V   ORDERED BY: ALEJANDRO MODI     ACC: [id number] EXAM:  XR NEON PORT CHEST ABD STAT 1V   ORDERED BY: ALEJANDRO MODI     ACC: [id number] EXAM:  XR CHEST PORTABLE URGENT 1V   ORDERED BY: BRANDIN MAYS     PROCEDURE DATE:  03/14/2024          INTERPRETATION:  INDICATION: NG tube    Portable chest 3/14/2024    COMPARISON: 3/12/2024    FINDINGS:  Heart/Vascular: The heart size, mediastinum, hilum and aorta are within   normal limits for projection.  Pulmonary: Midline trachea. There is no focal infiltrate, congestion or   effusion.  Bones: There is no fracture.  Lines and catheter: NG tube below diaphragm, tip beyond field-of-view    Portable chest 3/15/2024 at 5:10 AM    Overlying EKG leads, wires and other artifacts    FINDINGS: ET tube is high at level of clavicles. NG tube below diaphragm,   tip beyond field-of-view. Probable small left pleural effusion. Left   hilar and retrocardiac opacity/atelectasis. No pulmonary venous   congestion. No pneumothorax. Heart size and mediastinum stable    Portable chest 3/15/2024 at 7:03 AM    FINDINGS: Tip of right IJ venous catheter in distal SVC. ET tube remains   somewhat high in position. NG tube below diaphragm. Again seen is a   probable small left pleural effusion. Left perihilar and retrocardiac   opacity/atelectasis.    Impression:    ET tube slightly high in position consider advancing    Tip of NG tube not seen.    Tip of right IJ venous catheter in distal SVC. No pneumothorax    Again seen is a probable small left pleural effusion. Left perihilar and   retrocardiac opacity/atelectasis.    --- End of Report ---            DESTINEE BUENROSTRO DO; Attending Radiologist  This document has been electronically signed. Mar 15 2024 11:06AM    < end of copied text >

## 2024-03-15 NOTE — PROVIDER CONTACT NOTE (EICU) - BACKGROUND
58 year old male w/ PMHX of obesity, admitted w/ sepsis, hyperglycemic with +RVP for coronavirus and MSSA bacteremia of undetermined source. While on the floor he experienced  new onset a-fib w/ RVR. Pt with multiple RRTs yesterday for persistent AMS and lethargy with a-fib with RVR not responding to IVP metoprolol. He was started on Cardizem gtt c/b hypotension and changed over to Amio drip in the ICU. S/P Cardiac arrest this morning.

## 2024-03-15 NOTE — PROGRESS NOTE ADULT - SUBJECTIVE AND OBJECTIVE BOX
Cardiology Progress Note    Interval Events:  Pt with aspiration and cardiac arrest earlier this AM  Pt now intubated and on vasopressors with persistent ventilation issues  currently in sinus rhythm  remains acidotic  POCUS with bi-V failure post-arrest      MEDICATIONS:  aMIOdarone Infusion 1 mG/Min IV Continuous <Continuous>  aMIOdarone Infusion 0.5 mG/Min IV Continuous <Continuous>  enoxaparin Injectable 110 milliGRAM(s) SubCutaneous every 12 hours  lisinopril 2.5 milliGRAM(s) Oral daily  norepinephrine Infusion 0.05 MICROgram(s)/kG/Min IV Continuous <Continuous>  ceFAZolin   IVPB      ceFAZolin   IVPB 2000 milliGRAM(s) IV Intermittent every 8 hours  cisatracurium Infusion 3 MICROgram(s)/kG/Min IV Continuous <Continuous>  cisatracurium Injectable 20 milliGRAM(s) IV Push once  fentaNYL    Injectable 50 MICROGram(s) IV Push once  fentaNYL   Infusion. 0.5 MICROgram(s)/kG/Hr IV Continuous <Continuous>  ondansetron Injectable 4 milliGRAM(s) IV Push every 8 hours PRN  bisacodyl 5 milliGRAM(s) Oral daily PRN  lactulose Syrup 30 Gram(s) Oral every 6 hours  hydrocortisone sodium succinate Injectable 100 milliGRAM(s) IV Push every 8 hours  insulin glargine Injectable (LANTUS) 26 Unit(s) SubCutaneous at bedtime  insulin lispro (ADMELOG) corrective regimen sliding scale   SubCutaneous every 4 hours  vasopressin Infusion 0.04 Unit(s)/Min IV Continuous <Continuous>  chlorhexidine 0.12% Liquid 15 milliLiter(s) Oral Mucosa every 12 hours  chlorhexidine 2% Cloths 1 Application(s) Topical <User Schedule>  sodium bicarbonate  Infusion 0.142 mEq/kG/Hr IV Continuous <Continuous>      PHYSICAL EXAM:  T(C): 37.6 (03-15-24 @ 03:00), Max: 38.8 (03-14-24 @ 15:00)  HR: 88 (03-15-24 @ 10:15) (0 - 164)  BP: 130/97 (03-15-24 @ 10:00) (62/48 - 185/169)  RR: 32 (03-15-24 @ 10:15) (0 - 39)  SpO2: 81% (03-15-24 @ 08:40) (80% - 100%)  Wt(kg): --  I&O's Summary    14 Mar 2024 07:01  -  15 Mar 2024 07:00  --------------------------------------------------------  IN: 2141.6 mL / OUT: 3010 mL / NET: -868.4 mL    Appearance: intubated, sedated  HEENT:   ETT present  Cardiovascular: Normal S1 S2, no elevated JVP, no murmurs, trace edema  Respiratory: coarse breath sounds  Psychiatry: unable to assess  Gastrointestinal: distended	  Skin: no cyanosis	  Neurologic: unable to assess  Extremities: trace edema, IO catheter L tibia    LABS:	 	  CBC Full  -  ( 15 Mar 2024 08:21 )  WBC Count : 18.54 K/uL  Hemoglobin : 14.1 g/dL  Hematocrit : 44.3 %  Platelet Count - Automated : x    03-15    147<H>  |  107  |  91<H>  ----------------------------<  148<H>  7.4<HH>   |  18<L>  |  2.89<H>    03-15  137  |  101  |  92<H>  ----------------------------<  494<HH>  4.1   |  19<L>  |  2.12<H>    Ca    11.7<H>      15 Mar 2024 08:21  Ca    9.4      15 Mar 2024 03:15  Phos  >18.0     03-15  Phos  6.2     03-15  Mg     4.3     03-15  Mg     3.4     03-15    TPro  5.8<L>  /  Alb  1.4<L>  /  TBili  1.0  /  DBili  x   /  AST  16459<H>  /  ALT  5506<H>  /  AlkPhos  372<H>  03-15  TPro  7.5  /  Alb  1.8<L>  /  TBili  0.4  /  DBili  x   /  AST  21  /  ALT  41  /  AlkPhos  144<H>  03-15      TELEMETRY: 	  SR  ECG:  	  RADIOLOGY:  OTHER: 	    PREVIOUS DIAGNOSTIC TESTING:    [x] Echocardiogram: < from: TTE Echo Complete w/o Contrast w/ Doppler (03.13.24 @ 15:41) >  Summary:   1. Technically limited study.   2. Left ventricle not well visualized, grossly normal systolic function.   3. Normal right ventricular size and function.   4. The left atrium is normal in size.   5. Mild mitral annular calcification.   6. Normal trileaflet aortic valve with normal opening.    < end of copied text >    [ ] Catheterization:  [ ] Stress Test:

## 2024-03-15 NOTE — DISCHARGE NOTE FOR THE EXPIRED PATIENT - HOSPITAL COURSE
58 year old male PMHx morbid obeseity (BMI 39) DM, HTN who presented with hyperglycemia. Daughter reported for past 2 weeks he had generalized weakness. Yesterday fell a couple times after which he had difficulty moving RLE. He had poor appetite and abdominal pain diffusely and home. He endorses pain in his lower back and his shoulder which and he had been passing gas but stated was constipated last BM was 3 days ago.  He reported taking oxycodone and that might be the cause and endorsed a generalized abdominal pain due to distension In the ER     He was admitted to medicine service with sepsis, hyperglycemic with +RVP for coronavirus and MSSA bacteremia of undetermined source. While on the floor he experienced  new onset a-fib w/ RVR. Pt with multiple RRTs yesterday for persistent AMS and lethargy with a-fib with RVR not responding to IVP metoprolol. He was started on Cardizem gtt c/b hypotension and changed over to Amio drip in the ICU.  Pt intubated overnight, experienced several cardiac arrests ~25min total dowtime and then developed ARDS and multisystem organ failure with refractory triple pressor shock. Family made decision to make him DNR/DNI.    Called to bedside to evaluate the patient for aystole .     On physical exam, patient did not respond to verbal or noxious stimuli.  No spontaneous respirations.  Absent heart and breath sounds.  Absent radial and carotid pulses.   Pupils are fixed and dilated, no corneal reflex.  EKG rhythm strip shows asystole.   Patient pronounced dead at 14:15_.  Attending notified.  Family at bedside. Emotional support provided.

## 2024-03-15 NOTE — PROGRESS NOTE ADULT - PROVIDER SPECIALTY LIST ADULT
Cardiology
Critical Care
Endocrinology
Infectious Disease
Neurology
Neurology
Infectious Disease
Cardiology
Hospitalist

## 2024-03-15 NOTE — PHARMACOTHERAPY INTERVENTION NOTE - NSPHARMCOMMASP
ASP - Dose optimization/Non-Renal dose adjustment
ASP - Lab/ test recommended
ASP - Incorrect timing

## 2024-03-15 NOTE — PROCEDURE NOTE - NSPROCDETAILS_GEN_ALL_CORE
location identified, draped/prepped, sterile technique used, needle inserted/introduced/positive blood return obtained via catheter/connected to a pressurized flush line/sutured in place/hemostasis with direct pressure, dressing applied/Seldinger technique/all materials/supplies accounted for at end of procedure
guidewire recovered
location identified, draped/prepped, sterile technique used/lumen(s) aspirated and flushed/sterile dressing applied
patient pre-oxygenated, tube inserted, placement confirmed

## 2024-03-15 NOTE — PROGRESS NOTE ADULT - ASSESSMENT
A/P-  58 year old male with morbid obese (BMI 39) DM, HTN presented  with hyperglycemia and weakness   on admission  found to be septic, hyperglycemic with +RVP for coronavirus( not covid)  and MSSA bacteremia of undetermined source.     new onset a-fib with RVR started on amio drip and is in CCU now  s/p cardiac arrest and is now intubated and on 100% Fio2  he also noted to have elevated ammonia level - has been started  on lactulose as per CCU team  febrile  MSSA bacteremia- source not defined yet  TTE- no vegetation as per report.    metabolic encephalopathy  metabolic acidosis  MSSA bacteremia  aspiration pneumonia /resp failure  Multiorgan failure at this time.  transaminitis secondary to most likelyy shock liver as the levels were normal range previous day    plan-  patient has been on cefazolin for mssa bacteremia for past 3 days, however, in light of his multiorgan failure and aspiration pneumonia and sepsis ewill d//c cefazolin and broaden coverage to meropenem to also cover for gram negatives as well as anaerobes and it would cover MSSA as well.  check 2 more blood cx.  if bacteremia does not clear will need JORDAN , however, not stable for such procedure at this time as he is on pressors, and obtunded and critically ill.  vent management and cardiac management as per CCU team.    All labs and imaging and chart notes reviewed.     critical care time spent 40 minutes.    prognosis guarded.    Miguel Patel MD  Infectious Disease Attending    Please note  is covering the ID service this weekend and if any questions he can be reached  either via teams or by calling [phone]         A/P-  58 year old male with morbid obese (BMI 39) DM, HTN presented  with hyperglycemia and weakness   on admission  found to be septic, hyperglycemic with +RVP for coronavirus( not covid)  and MSSA bacteremia of undetermined source.     new onset a-fib with RVR started on amio drip and is in CCU now  s/p cardiac arrest and is now intubated and on 100% Fio2  he also noted to have elevated ammonia level - has been started  on lactulose as per CCU team  febrile  MSSA bacteremia- source not defined yet  TTE- no vegetation as per report.    metabolic encephalopathy  metabolic acidosis  MSSA bacteremia  aspiration pneumonia /resp failure  Multiorgan failure at this time.  transaminitis secondary to most likelyy shock liver as the levels were normal range previous day    plan-  patient has been on cefazolin for mssa bacteremia for past 3 days, however, in light of his multiorgan failure and aspiration pneumonia and sepsis will d//c cefazolin and broaden coverage to meropenem to also cover for gram negatives as well as anaerobes and it would cover MSSA as well.  check 2 more blood cx.  if bacteremia does not clear will need JORDAN , however, not stable for such procedure at this time as he is on pressors, and obtunded and critically ill.  vent management and cardiac management as per CCU team.    All labs and imaging and chart notes reviewed.     critical care time spent 40 minutes.    prognosis guarded.    Miguel Patel MD  Infectious Disease Attending    Please note  is covering the ID service this weekend and if any questions he can be reached  either via teams or by calling [phone]

## 2024-03-15 NOTE — PROVIDER CONTACT NOTE (CRITICAL VALUE NOTIFICATION) - PERSON GIVING RESULT:
Qi
Lorin Lei [facility]
Lorin Quique/ [facility]
Caridad Solano
Panda Hansen
Blank Solano from lab
[facility]

## 2024-03-15 NOTE — PROGRESS NOTE ADULT - SUBJECTIVE AND OBJECTIVE BOX
Neurology Progress Note    Events noted, now intubated, sedated, and paralyzed after 2 RRTs.    Neuro Exam: Comatose. Intubated. Sedated. Paralyzed. Pupils on right is 5mm and left pupil is 4mm and not reactive to light. No VOR. No corneal reflex. No withdrawal to pain.    MRI brain- pending  Echo- limited but normal EF  CTA head/neck- no significant stenosis  HgbA1c- 9.3  LDL-  EEG- no seizure focus    A/P:   Hepatic encephalopathy  Hyperammonemia  Cardiac arrest  Right leg weakness  Sepsis  Respiratory failure  Abdominal distention  New onset A-fib  Back pain  DM2  HTN  TSH low    - MRI brain and L-spine pending. Not stable to go down for testing  - on full dose Lovenox for possible stroke prevention. Statin held due to liver dysfunction  - EEG shows no seizure focus.  - ammonia increasing  - neuro exam is limited due to sedation and paralytic on board.  - discussed with father and sister at bedside.  - poor overall prognosis

## 2024-03-15 NOTE — PROCEDURE NOTE - ADDITIONAL PROCEDURE DETAILS
This IO insertion was in the setting of cardiac arrest and poor peripheral IV access placed for quick access of ACLS meds and pressors
This central line was placed in the setting of post cardiac arrest now in shock state requiring two IV pressors to maintain blood pressure and likely Bicarb gtt
This intubation was in the setting of acute respiratory failure after patient became unresponsive and was being intubated for airway protection the patient stopped breathing then went into cardiac arrest just after intubation.

## 2024-03-15 NOTE — PROGRESS NOTE ADULT - REASON FOR ADMISSION
sepsis, new Afib

## 2024-03-15 NOTE — PROCEDURE NOTE - NSINDICATIONS_GEN_A_CORE
respiratory distress/respiratory failure
poor peripheral access/cardiac arrest
arterial puncture to obtain ABG's/critical patient
critical illness

## 2024-03-15 NOTE — PROVIDER CONTACT NOTE (CRITICAL VALUE NOTIFICATION) - SITUATION
Blood culture collected in 03/12 , both bottles with gram positive cocci in clusters
Gram + cocci in pairs in blood culture
Lab called with [facility]. Amanuel FERNANDEZ aware. Primary RN also aware

## 2024-03-15 NOTE — CHART NOTE - NSCHARTNOTEFT_GEN_A_CORE
58M w/ hx morbid obese (BMI 39) diabetes, HTN presenting with hyperglycemia. Daughter reports for past 2w pt has not been acting himself, generalized weakness. Yesterday fell a couple times after which he had difficulty moving RLE. Poor appetite. +abd pain diffusely, none currently No fever, cp, sob, n/v. Hx alcohol used but stopped 2yr ago.  Pt endorses pain in his lower back and his shoulder which isn't new but worse than normal. He had been  passing gas but stated was constipated last BM was 3 days ago.  He stated  he was taking oxycodone and that might be the cause and  endorsed a generalized abdominal pain due to distension  In ED pt originally with afib    He was admitted to medicine service with sepsis, hyperglycemic with +RVP for coronavirus and MSSA bacteremia of undetermined source. While on the floor he experienced  new onset a-fib w/ RVR. Pt with multiple RRTs yesterday for persistent AMS and lethargy with a-fib with RVR not responding to IVP metoprolol. He was started on Cardizem gtt c/b hypotension and changed over to Amio drip in the ICU.     This morning he began with increased work of breathing with accessory muscle use and signs of volume overload with rales audible bilaterally  in all fields. Awake and responding he was placed on BIPAP and diuresed with lasix.     ICU Vital Signs Last 24 Hrs  T(C): 38.2 (14 Mar 2024 23:00), Max: 38.8 (14 Mar 2024 10:02)  T(F): 100.8 (14 Mar 2024 23:00), Max: 101.9 (14 Mar 2024 15:00)  HR: 159 (15 Mar 2024 03:00) (81 - 160)  BP: 147/129 (15 Mar 2024 03:00) (87/73 - 185/169)  BP(mean): 136 (15 Mar 2024 03:00) (76 - 176)  ABP: --  ABP(mean): --  RR: 29 (15 Mar 2024 03:00) (26 - 37)  SpO2: 99% (15 Mar 2024 03:00) (82% - 100%)    O2 Parameters below as of 14 Mar 2024 07:41  Patient On (Oxygen Delivery Method): nasal cannula  O2 Flow (L/min): 3    Physicial Exam:     Constitutional: unresponsive off sedation   HEENT: PERRLA, EOMI, no drainage or redness, airway patent   Neck:  No JVD  Respiratory: Breath Sounds equal & clear to percussion & auscultation, no accessory muscle use  Cardiovascular: Regular rate & rhythm, normal S1, S2; no murmurs, gallops or rubs; no S3, S4  Gastrointestinal: Soft, non-tender, non distended no hepatosplenomegaly, normal bowel sounds  Extremities: No peripheral edema, No cyanosis, clubbing   Vascular: Equal and normal pulses: 2+ peripheral pulses throughout  Neurological:   Musculoskeletal: No joint pain, swelling or deformity; no limitation of movement  Skin: No rashes, Warm and well perfused HPI: 58 year old male PMHx morbid obeseity (BMI 39) DM, HTN who presented with hyperglycemia. Daughter reported for past 2w pt has not been acting himself, generalized weakness. Yesterday fell a couple times after which he had difficulty moving RLE. Poor appetite. +abd pain diffusely, none currently No fever, cp, sob, n/v. Hx alcohol used but stopped 2yr ago.  Pt endorses pain in his lower back and his shoulder which isn't new but worse than normal. He had been  passing gas but stated was constipated last BM was 3 days ago.  He stated  he was taking oxycodone and that might be the cause and  endorsed a generalized abdominal pain due to distension  In ED pt originally with afib    He was admitted to medicine service with sepsis, hyperglycemic with +RVP for coronavirus and MSSA bacteremia of undetermined source. While on the floor he experienced  new onset a-fib w/ RVR. Pt with multiple RRTs yesterday for persistent AMS and lethargy with a-fib with RVR not responding to IVP metoprolol. He was started on Cardizem gtt c/b hypotension and changed over to Amio drip in the ICU.     This morning he began with increased work of breathing with accessory muscle use and signs of volume overload with rales audible bilaterally  in all fields. Awake and responding he was placed on BIPAP and diuresed with lasix.     ICU Vital Signs Last 24 Hrs  T(C): 38.2 (14 Mar 2024 23:00), Max: 38.8 (14 Mar 2024 10:02)  T(F): 100.8 (14 Mar 2024 23:00), Max: 101.9 (14 Mar 2024 15:00)  HR: 159 (15 Mar 2024 03:00) (81 - 160)  BP: 147/129 (15 Mar 2024 03:00) (87/73 - 185/169)  BP(mean): 136 (15 Mar 2024 03:00) (76 - 176)  ABP: --  ABP(mean): --  RR: 29 (15 Mar 2024 03:00) (26 - 37)  SpO2: 99% (15 Mar 2024 03:00) (82% - 100%)    O2 Parameters below as of 14 Mar 2024 07:41  Patient On (Oxygen Delivery Method): nasal cannula  O2 Flow (L/min): 3    Physicial Exam:     Constitutional: unresponsive off sedation   HEENT: PERRLA, EOMI, no drainage or redness, airway patent   Neck:  No JVD  Respiratory: Breath Sounds equal & clear to percussion & auscultation, no accessory muscle use  Cardiovascular: Regular rate & rhythm, normal S1, S2; no murmurs, gallops or rubs; no S3, S4  Gastrointestinal: Soft, non-tender, non distended no hepatosplenomegaly, normal bowel sounds  Extremities: No peripheral edema, No cyanosis, clubbing   Vascular: Equal and normal pulses: 2+ peripheral pulses throughout  Neurological:   Musculoskeletal: No joint pain, swelling or deformity; no limitation of movement  Skin: No rashes, Warm and well perfused HPI: 58 year old male PMHx morbid obeseity (BMI 39) DM, HTN who presented with hyperglycemia. Daughter reported for past 2 weeks he had generalized weakness. Yesterday fell a couple times after which he had difficulty moving RLE. He had poor appetite and abdominal pain diffusely and home. He endorses pain in his lower back and his shoulder which and he had been passing gas but stated was constipated last BM was 3 days ago.  He reported taking oxycodone and that might be the cause and endorsed a generalized abdominal pain due to distension In the ER     He was admitted to medicine service with sepsis, hyperglycemic with +RVP for coronavirus and MSSA bacteremia of undetermined source. While on the floor he experienced  new onset a-fib w/ RVR. Pt with multiple RRTs yesterday for persistent AMS and lethargy with a-fib with RVR not responding to IVP metoprolol. He was started on Cardizem gtt c/b hypotension and changed over to Amio drip in the ICU.     This morning he began with increased work of breathing with accessory muscle use and signs of volume overload with rales audible bilaterally  in all fields. Awake and responding he was placed on BIPAP and diuresed with lasix.     ICU Vital Signs Last 24 Hrs  T(C): 38.2 (14 Mar 2024 23:00), Max: 38.8 (14 Mar 2024 10:02)  T(F): 100.8 (14 Mar 2024 23:00), Max: 101.9 (14 Mar 2024 15:00)  HR: 159 (15 Mar 2024 03:00) (81 - 160)  BP: 147/129 (15 Mar 2024 03:00) (87/73 - 185/169)  BP(mean): 136 (15 Mar 2024 03:00) (76 - 176)  ABP: --  ABP(mean): --  RR: 29 (15 Mar 2024 03:00) (26 - 37)  SpO2: 99% (15 Mar 2024 03:00) (82% - 100%)    O2 Parameters below as of 14 Mar 2024 07:41  Patient On (Oxygen Delivery Method): nasal cannula  O2 Flow (L/min): 3    Physicial Exam:     Constitutional: unresponsive off sedation   HEENT: PERRLA, EOMI, no drainage or redness, airway patent   Neck:  No JVD  Respiratory: Breath Sounds equal & clear to percussion & auscultation, no accessory muscle use  Cardiovascular: Regular rate & rhythm, normal S1, S2; no murmurs, gallops or rubs; no S3, S4  Gastrointestinal: Soft, non-tender, non distended no hepatosplenomegaly, normal bowel sounds  Extremities: No peripheral edema, No cyanosis, clubbing   Vascular: Equal and normal pulses: 2+ peripheral pulses throughout  Neurological:   Musculoskeletal: No joint pain, swelling or deformity; no limitation of movement  Skin: No rashes, Warm and well perfused HPI: 58 year old male PMHx morbid obeseity (BMI 39) DM, HTN who presented with hyperglycemia. Daughter reported for past 2 weeks he had generalized weakness. Yesterday fell a couple times after which he had difficulty moving RLE. He had poor appetite and abdominal pain diffusely and home. He endorses pain in his lower back and his shoulder which and he had been passing gas but stated was constipated last BM was 3 days ago.  He reported taking oxycodone and that might be the cause and endorsed a generalized abdominal pain due to distension In the ER     He was admitted to medicine service with sepsis, hyperglycemic with +RVP for coronavirus and MSSA bacteremia of undetermined source. While on the floor he experienced  new onset a-fib w/ RVR. Pt with multiple RRTs yesterday for persistent AMS and lethargy with a-fib with RVR not responding to IVP metoprolol. He was started on Cardizem gtt c/b hypotension and changed over to Amio drip in the ICU. He had NG tube in place that was putting out gastric contents while on wall suction and had had 2-3 decent size soft bowl movements since start of shift     This morning he began with increased work of breathing with accessory muscle use and signs of volume overload with rales audible bilaterally  in all fields. Awake and responding he was placed on BIPAP and diuresed with Lasix but shortly after he became unresponsive and was taken off BIPAP due to lack of ability to remove the mask. He was quickly noticed to not be bradycardic and not able to manage his airway and was intubated. During intubation large amount of gastric contents came from the  stomach, with the glydescope the SALAD technique was use to clear the airway and the suction catheter was placed in the esophagus to divert gastric contents and intubate. he at that time was noticed to not have pulse and was in cardiac arrest in which time CPR and ACLS were immediately established. Asystole was the initial rtyhm changing to PEA, the resuscitated efforts lasted 15 mins with ROSC, he was in shock state , placed on IV pressors to maintain MAP and after arpox 8 mins of spontaneous pulse he went into PEA arrest for the second time. CPR was again immediately established and ACLS protocols were followed for aprox 8 mins with ROSC a second time. Post cardiac arrest he progressed to shock state requiring 2 IV pressors to maintain blood pressure, his mental status showed pupil that were dilated and non reactive, non responsive to pain but he had a gag reflex     A&P    58 year old male staus post cardiac arrest now in cardiogenic shock requiring IV pressors     Acute Hypoxic respiratory failure   -Patient currently on Full vent support  -titrate settings to maintain SaO2 >90%, or pH >7.25  -consider low titdal volume ventilation strategy w/ goal Tv 4-6 cc/kg of ideal body weight  -plateu pressure goal <30  -Peridex oral care and VAP prophylaxis with HOB 30 degrees   -aggressive chest PT and suctioning   -daily sedation vacation with spontaneous breathing trial if clinical condition warrants, discuss with respiratory therapy     Cardiogenic shock   -patient currently on Levophed and Vasopressin, will titrate for MAP 65-70  -repeat lactate  -bedside POCUS shows decreased ventricular function   -goal UOP >0.5 cc/kg/hr  -monitor urine output with strict I&O   -fluid restirct   -obtain Pro BNP and Troponin levels  -obtain echocardiogram     Mixed respiratoy and metabolic acidosis  -incraased resp rate to lower PCO2   -Consider IV Bicarb  -Follow up BMP and ABG     HUNTER  -Hold nephrotoxic meds  -Continue aggressive hydration  -Trend urine output  -Follow up BUN/Creatinine  -follow up electrolytes     aspiration pneumonitis  -consider starting IV antibiotics     Goals of care sissucion will be needed with family buy the day team today     ICU Vital Signs Last 24 Hrs  T(C): 38.2 (14 Mar 2024 23:00), Max: 38.8 (14 Mar 2024 10:02)  T(F): 100.8 (14 Mar 2024 23:00), Max: 101.9 (14 Mar 2024 15:00)  HR: 159 (15 Mar 2024 03:00) (81 - 160)  BP: 147/129 (15 Mar 2024 03:00) (87/73 - 185/169)  BP(mean): 136 (15 Mar 2024 03:00) (76 - 176)  ABP: --  ABP(mean): --  RR: 29 (15 Mar 2024 03:00) (26 - 37)  SpO2: 99% (15 Mar 2024 03:00) (82% - 100%)    O2 Parameters below as of 14 Mar 2024 07:41  Patient On (Oxygen Delivery Method): nasal cannula  O2 Flow (L/min): 3    Physicial Exam:     Constitutional: unresponsive off sedation   Neck:  No JVD  Respiratory: Breath Sounds show rales auscultation, no accessory muscle use with intubation   Cardiovascular: bradycardic rate & regular  rhythm, normal S1, S2; no murmurs, gallops or rubs; no S3, S4  Gastrointestinal: distended normal bowel sounds  Extremities: No peripheral edema, No cyanosis, clubbing   Vascular: Equal and normal pulses: 2+ peripheral pulses throughout  Neurological: non responsive to pain and pupils dilated and non reactive to light gag reflex is present     Critical Care time: 60 mins assessing presenting problems of acute illness that poses high probability of life threatening deterioration or end organ damage/dysfunction.  Medical decision making inculding Initiating plan of care, reviewing data, reviewing radiology,direct patient bedside evaluation and interpretation of vital signs, any necessary ventilator management , discusion with multidisciplinary team, discussing goals of care with patient/family, all non inclusive of procedures, COVID 19 specific considerations and therapeutic  options based on the available and rapidly changing literature   Note: Date of entry of this note is equal to the date of services rendered aslthoug some documentation aspects of HPI and critical care time documentation were entered at later time

## 2024-03-15 NOTE — PROGRESS NOTE ADULT - ASSESSMENT
58M HTN, DM who presented with malaise, found to be septic, hyperglycemic with +RVP for coronavirus, MSSA bacteremia, abd distention.    Pt with episodes of pAF and persisting poor mental status with ammonia elevation  Pt is now critically ill with evidence of multi-organ dysfunction    overall there is a poor prognosis.  hold amio and statin in the setting of transaminases c/w shock liver  type A + type B lactic acidosis  hypoxic respiratory failure despite FiO2 100%  in sinus rhythm    supportive care as per ICU  goals of care    will follow only as needed. please call back with any questions or concerns.

## 2024-03-15 NOTE — CHART NOTE - NSCHARTNOTEFT_GEN_A_CORE
:  Amanuel Ramires PA-C    Indication:  SHOCK    PROCEDURE:  [x ] LIMITED ECHO  [x ] LIMITED CHEST  [ ] LIMITED RETROPERITONEAL  [ ] LIMITED ABDOMINAL  [ ] LIMITED DVT  [ ] NEEDLE GUIDANCE VASCULAR  [ ] NEEDLE GUIDANCE THORACENTESIS  [ ] NEEDLE GUIDANCE PARACENTESIS  [ ] NEEDLE GUIDANCE PERICARDIOCENTESIS  [ ] OTHER    FINDINGS:  Chest: A-line predominant, normal aeration pattern bilat. No effusions bilat. bibasilar consolidations     ECHO: technically limited study LV>RV with severely reduced BiV function. no septal bowing or flattening   No pericardial effusion  IVC: virtual    INTERPRETATION:  lung exam with bibasilar consolidations   cardiac exam with severely reduced BiV function; cant r/o cardiogenic component to shock state  volume tolerant      images uploaded to [facility]

## 2024-03-15 NOTE — PROGRESS NOTE ADULT - ASSESSMENT
58M year old male w/ hx morbid obese (BMI 39) DM, HTN presenting with hyperglycemia. Daughter reports for past 2w pt has not been acting himself, generalized weakness. with multiples falls. In ED pt found to be septic, hyperglycemic with +RVP for coronavirus and MSSA bacteremia of undetermined source. Hospital course c/b new onset a-fib with RVR started on amio drip. Transferred to ICU. Noted to have voluminous NGT output now s/p Cardiac arrest from massive aspiration, prolonged to ~25 minutes. Now in multi-organ failure/shock. Severe met acidosis.      Follow neuro checks.  Poor ventilation/oxygenation. Will fully sedate and paralyze. Patients body habitus prohibits proning.   Patient has minimal neuro reflexes currently.  Bicarb gtt. multiple vasopressor titration. trend ABG/Lactate  NPO. ppi ppx. NGT to intermittent suction.  hyperK cocktail. monitor urine output. Acute renal failure.  abx of empiric abd coverage.  dvt ppx  Prognosis extremely poor at the current moment.

## 2024-03-15 NOTE — PHARMACOTHERAPY INTERVENTION NOTE - COMMENTS
As per policy vancomycin trough ordered. 
Per pharmacy policy for dose optimization on antimicrobials, retimed second dose of piperacillin/tazobactam to be 4 hours after initial loading dose.
Recommended to give meropenem as an extended infusion to maximize time above DAVID.

## 2024-03-15 NOTE — PROVIDER CONTACT NOTE (CRITICAL VALUE NOTIFICATION) - TEST AND RESULT REPORTED:
Potassium 7.4, Lactate 14.5
Gram + cocci in pairs in blood culture
Glucose 494
Anearobic bottle- Gram +ve Cocci
Phos is greater then 18
blood culture
Blood Cultures from 03/14/2024  Growth in anearobic bottle, gram positive cocci in clusters

## 2024-03-15 NOTE — PROGRESS NOTE ADULT - SUBJECTIVE AND OBJECTIVE BOX
INTERVAL HPI/OVERNIGHT EVENTS:   HPI:  58M w/ hx morbid obese (BMI 39) diabetes, HTN presenting with hyperglycemia. Daughter reports for past 2w pt has not been acting himself, generalized weakness. Yesterday fell a couple times after which he had difficulty moving RLE. Poor appetite. +abd pain diffusely, none currently No fever, cp, sob, n/v. Hx alcohol used but stopped 2yr ago.      Pt endorses pain in his lower back and his shoulder which isn't new but worse than normal. Pt endorses passing gas but is constipated last BM was 3 days ago. Says he was taking oxycodone and that might be the cause. Does endorse a generalized abdominal pain due to distension. In ED pt originally with afib. s/p cardizem now sinus.  (12 Mar 2024 22:11)      CENTRAL LINE: [ ] YES [ ] NO  LOCATION:   DATE INSERTED:  REMOVE: [ ] YES [ ] NO  EXPLAIN:    GONSALES: [ ] YES [ ] NO    DATE INSERTED:  REMOVE:  [ ] YES [ ] NO  EXPLAIN:    A-LINE:  [ ] YES [ ] NO  LOCATION:   DATE INSERTED:  REMOVE:  [ ] YES [ ] NO  EXPLAIN:    PAST MEDICAL & SURGICAL HISTORY:  DM (diabetes mellitus)      Morbidly obese      Other and unspecified ventral hernia with obstruction, without gangrene      Morbidly obese      H/O ventral hernia repair  2006          REVIEW OF SYSTEMS:    Negative ROS aside from HPI/Interval events above.    ICU Vital Signs Last 24 Hrs  T(C): 37.6 (15 Mar 2024 03:00), Max: 38.8 (14 Mar 2024 15:00)  T(F): 99.7 (15 Mar 2024 03:00), Max: 101.9 (14 Mar 2024 15:00)  HR: 88 (15 Mar 2024 10:15) (0 - 164)  BP: 130/97 (15 Mar 2024 10:00) (62/48 - 185/169)  BP(mean): 105 (15 Mar 2024 10:00) (55 - 176)  ABP: --  ABP(mean): --  RR: 32 (15 Mar 2024 10:15) (0 - 39)  SpO2: 81% (15 Mar 2024 08:40) (80% - 100%)        ABG - ( 15 Mar 2024 08:47 )  pH, Arterial: 6.90  pH, Blood: x     /  pCO2: 93    /  pO2: 64    / HCO3: 19    / Base Excess: -15.8 /  SaO2: 81.0                I&O's Detail    14 Mar 2024 07:01  -  15 Mar 2024 07:00  --------------------------------------------------------  IN:    Amiodarone: 166.5 mL    Amiodarone: 217.1 mL    IV PiggyBack: 1150 mL    Norepinephrine: 596 mL    Vasopressin: 12 mL  Total IN: 2141.6 mL    OUT:    Indwelling Catheter - Urethral (mL): 1460 mL    Nasogastric/Oral tube (mL): 1550 mL  Total OUT: 3010 mL    Total NET: -868.4 mL          Mode: AC/ CMV (Assist Control/ Continuous Mandatory Ventilation)  RR (machine): 32  TV (machine): 450  FiO2: 100  PEEP: 12  ITime: 1  MAP: 14  PIP: 27    CAPILLARY BLOOD GLUCOSE      POCT Blood Glucose.: 117 mg/dL (15 Mar 2024 09:42)  POCT Blood Glucose.: 274 mg/dL (15 Mar 2024 06:26)  POCT Blood Glucose.: 398 mg/dL (15 Mar 2024 01:24)  POCT Blood Glucose.: 512 mg/dL (15 Mar 2024 01:20)  POCT Blood Glucose.: 407 mg/dL (14 Mar 2024 21:41)  POCT Blood Glucose.: 358 mg/dL (14 Mar 2024 18:39)  POCT Blood Glucose.: 333 mg/dL (14 Mar 2024 11:28)  POCT Blood Glucose.: 393 mg/dL (14 Mar 2024 11:26)        PHYSICAL EXAM:    unresponsive.  pupils fixed nonreactive 3-4mm b/l  Vent support  distended abd.  not moving extremities.  no edema.      LABS:                        14.1   18.54 )-----------( x        ( 15 Mar 2024 08:21 )             44.3      03-15    147<H>  |  107  |  91<H>  ----------------------------<  148<H>  7.4<HH>   |  18<L>  |  2.89<H>    Ca    11.7<H>      15 Mar 2024 08:21  Phos  >18.0     03-15  Mg     4.3     03-15    TPro  5.8<L>  /  Alb  1.4<L>  /  TBili  1.0  /  DBili  x   /  AST  11747<H>  /  ALT  5506<H>  /  AlkPhos  372<H>  03-15      Urinalysis Basic - ( 15 Mar 2024 08:21 )    Color: x / Appearance: x / SG: x / pH: x  Gluc: 148 mg/dL / Ketone: x  / Bili: x / Urobili: x   Blood: x / Protein: x / Nitrite: x   Leuk Esterase: x / RBC: x / WBC x   Sq Epi: x / Non Sq Epi: x / Bacteria: x      Culture Results:   Growth in anaerobic bottle: Gram Positive Cocci in Clusters  Growth in aerobic bottle: Gram Positive Cocci in Clusters (03-14 @ 10:39)  Culture Results:   Growth in anaerobic bottle: Gram Positive Cocci in Clusters  Growth in aerobic bottle: Gram Positive Cocci in Clusters (03-14 @ 10:39)  Culture Results:   Growth in anaerobic bottle: Gram Positive Cocci in Clusters  Growth in aerobic bottle: Gram Positive Cocci in Clusters (03-14 @ 04:20)  Culture Results:   Growth in anaerobic bottle: Gram Positive Cocci in Clusters  Growth in aerobic bottle: Gram Positive Cocci in Clusters (03-14 @ 04:05)  Culture Results:   No growth (03-12 @ 12:50)  Culture Results:   Growth in aerobic and anaerobic bottles: Staphylococcus aureus  Direct identification is available within approximately 3-5  hours either by Blood Panel Multiplexed PCR or Direct  MALDI-TOF. Details: [email or web address] (03-12 @ 12:15)  Culture Results:   Growth in aerobic and anaerobic bottles: Staphylococcus aureus  See previous culture [id number] (03-12 @ 12:15)

## 2024-03-16 LAB
CULTURE RESULTS: ABNORMAL
CULTURE RESULTS: ABNORMAL
GRAM STN FLD: ABNORMAL
GRAM STN FLD: ABNORMAL
SPECIMEN SOURCE: SIGNIFICANT CHANGE UP
SPECIMEN SOURCE: SIGNIFICANT CHANGE UP

## 2024-03-18 DIAGNOSIS — I11.0 HYPERTENSIVE HEART DISEASE WITH HEART FAILURE: ICD-10-CM

## 2024-03-18 DIAGNOSIS — R29.6 REPEATED FALLS: ICD-10-CM

## 2024-03-18 DIAGNOSIS — K76.82 HEPATIC ENCEPHALOPATHY: ICD-10-CM

## 2024-03-18 DIAGNOSIS — I48.0 PAROXYSMAL ATRIAL FIBRILLATION: ICD-10-CM

## 2024-03-18 DIAGNOSIS — R74.01 ELEVATION OF LEVELS OF LIVER TRANSAMINASE LEVELS: ICD-10-CM

## 2024-03-18 DIAGNOSIS — M54.9 DORSALGIA, UNSPECIFIED: ICD-10-CM

## 2024-03-18 DIAGNOSIS — R65.20 SEVERE SEPSIS WITHOUT SEPTIC SHOCK: ICD-10-CM

## 2024-03-18 DIAGNOSIS — E87.4 MIXED DISORDER OF ACID-BASE BALANCE: ICD-10-CM

## 2024-03-18 DIAGNOSIS — A41.01 SEPSIS DUE TO METHICILLIN SUSCEPTIBLE STAPHYLOCOCCUS AUREUS: ICD-10-CM

## 2024-03-18 DIAGNOSIS — B34.2 CORONAVIRUS INFECTION, UNSPECIFIED: ICD-10-CM

## 2024-03-18 DIAGNOSIS — R50.9 FEVER, UNSPECIFIED: ICD-10-CM

## 2024-03-18 DIAGNOSIS — E11.65 TYPE 2 DIABETES MELLITUS WITH HYPERGLYCEMIA: ICD-10-CM

## 2024-03-18 DIAGNOSIS — E87.5 HYPERKALEMIA: ICD-10-CM

## 2024-03-18 DIAGNOSIS — J80 ACUTE RESPIRATORY DISTRESS SYNDROME: ICD-10-CM

## 2024-03-18 DIAGNOSIS — G93.41 METABOLIC ENCEPHALOPATHY: ICD-10-CM

## 2024-03-18 DIAGNOSIS — F10.91 ALCOHOL USE, UNSPECIFIED, IN REMISSION: ICD-10-CM

## 2024-03-18 DIAGNOSIS — I46.8 CARDIAC ARREST DUE TO OTHER UNDERLYING CONDITION: ICD-10-CM

## 2024-03-18 DIAGNOSIS — N17.9 ACUTE KIDNEY FAILURE, UNSPECIFIED: ICD-10-CM

## 2024-03-18 DIAGNOSIS — I50.82 BIVENTRICULAR HEART FAILURE: ICD-10-CM

## 2024-03-18 DIAGNOSIS — E72.4 DISORDERS OF ORNITHINE METABOLISM: ICD-10-CM

## 2024-03-18 DIAGNOSIS — E66.9 OBESITY, UNSPECIFIED: ICD-10-CM

## 2024-03-18 DIAGNOSIS — Z66 DO NOT RESUSCITATE: ICD-10-CM

## 2024-03-18 DIAGNOSIS — Z79.84 LONG TERM (CURRENT) USE OF ORAL HYPOGLYCEMIC DRUGS: ICD-10-CM

## 2024-03-18 DIAGNOSIS — J18.9 PNEUMONIA, UNSPECIFIED ORGANISM: ICD-10-CM

## 2024-03-18 DIAGNOSIS — K59.00 CONSTIPATION, UNSPECIFIED: ICD-10-CM

## 2024-03-18 DIAGNOSIS — R57.0 CARDIOGENIC SHOCK: ICD-10-CM

## 2024-03-18 DIAGNOSIS — J69.0 PNEUMONITIS DUE TO INHALATION OF FOOD AND VOMIT: ICD-10-CM

## 2024-03-18 DIAGNOSIS — K72.00 ACUTE AND SUBACUTE HEPATIC FAILURE WITHOUT COMA: ICD-10-CM

## 2024-05-24 NOTE — H&P ADULT - ASSESSMENT
58M w/ hx morbid obese (BMI 39) DM, HTN presenting with hyperglycemia. Daughter reports for past 2w pt has not been acting himself, generalized weakness. with multiples falls. Pt septic in ED no source found. Abdomen distended with constipation passing gas.    1.95

## 2024-12-04 NOTE — PATIENT PROFILE ADULT - ..
Caller: Daughter in law-Edwige    Doctor: Fox    Reason for call: Is concerned because patient was hospitalized shortly after surgery and is not progressing as they had hoped. They are not sure if the surgery was successful because he has not had a postop yet. Patient is currently at [facility] rehab. Currently non weight bearing. Would like to get patient seen as soon as possible but [facility] would be transporting. Would need to be coordinated with them      Call back#: [phone]   12-Mar-2024 21:44:12